# Patient Record
Sex: MALE | Race: WHITE | NOT HISPANIC OR LATINO | URBAN - METROPOLITAN AREA
[De-identification: names, ages, dates, MRNs, and addresses within clinical notes are randomized per-mention and may not be internally consistent; named-entity substitution may affect disease eponyms.]

---

## 2023-10-05 ENCOUNTER — NURSING HOME VISIT (OUTPATIENT)
Dept: POST ACUTE CARE | Facility: EXTERNAL LOCATION | Age: 78
End: 2023-10-05
Payer: MEDICARE

## 2023-10-05 VITALS
SYSTOLIC BLOOD PRESSURE: 136 MMHG | OXYGEN SATURATION: 99 % | TEMPERATURE: 98.1 F | HEART RATE: 73 BPM | WEIGHT: 195.6 LBS | DIASTOLIC BLOOD PRESSURE: 87 MMHG | RESPIRATION RATE: 16 BRPM | BODY MASS INDEX: 31.57 KG/M2

## 2023-10-05 DIAGNOSIS — Z86.718 H/O DEEP VENOUS THROMBOSIS: ICD-10-CM

## 2023-10-05 DIAGNOSIS — I71.9 AORTIC ANEURYSM WITHOUT RUPTURE, UNSPECIFIED PORTION OF AORTA (CMS-HCC): ICD-10-CM

## 2023-10-05 DIAGNOSIS — I63.40 CEREBROVASCULAR ACCIDENT (CVA) DUE TO EMBOLISM OF CEREBRAL ARTERY (MULTI): ICD-10-CM

## 2023-10-05 DIAGNOSIS — I10 HTN (HYPERTENSION), BENIGN: Primary | ICD-10-CM

## 2023-10-05 DIAGNOSIS — R60.0 LOWER EXTREMITY EDEMA: ICD-10-CM

## 2023-10-05 DIAGNOSIS — F01.B0 MODERATE VASCULAR DEMENTIA WITHOUT BEHAVIORAL DISTURBANCE, PSYCHOTIC DISTURBANCE, MOOD DISTURBANCE, OR ANXIETY (MULTI): ICD-10-CM

## 2023-10-05 DIAGNOSIS — N18.30 STAGE 3 CHRONIC KIDNEY DISEASE, UNSPECIFIED WHETHER STAGE 3A OR 3B CKD (MULTI): ICD-10-CM

## 2023-10-05 DIAGNOSIS — J43.9 PULMONARY EMPHYSEMA, UNSPECIFIED EMPHYSEMA TYPE (MULTI): ICD-10-CM

## 2023-10-05 PROBLEM — J44.9 COPD (CHRONIC OBSTRUCTIVE PULMONARY DISEASE) (MULTI): Status: ACTIVE | Noted: 2023-10-05

## 2023-10-05 PROBLEM — F03.90 DEMENTIA WITHOUT BEHAVIORAL DISTURBANCE, PSYCHOTIC DISTURBANCE, MOOD DISTURBANCE, OR ANXIETY (MULTI): Status: ACTIVE | Noted: 2023-10-05

## 2023-10-05 PROCEDURE — 99308 SBSQ NF CARE LOW MDM 20: CPT | Performed by: PHYSICIAN ASSISTANT

## 2023-10-05 ASSESSMENT — ENCOUNTER SYMPTOMS
VOMITING: 0
SHORTNESS OF BREATH: 0
HEMATURIA: 0
CHILLS: 0
DYSURIA: 0
NERVOUS/ANXIOUS: 0
COUGH: 0
WHEEZING: 0
FREQUENCY: 0
DIZZINESS: 0
CONFUSION: 1
CONSTIPATION: 0
DIARRHEA: 0
WEAKNESS: 0
ABDOMINAL PAIN: 0
HEADACHES: 0
NAUSEA: 0
FEVER: 0
TREMORS: 0
APPETITE CHANGE: 0

## 2023-10-05 NOTE — ASSESSMENT & PLAN NOTE
Weight gain of 12#.   Pt has been off diuretics due to recent illness.   Will restart torsemide 20mg daily with potassium chloride 20MEQ daily.   Weekly bmp x 2 and monitor weekly weights.

## 2023-10-05 NOTE — ASSESSMENT & PLAN NOTE
Monitor labs.   Recent KENTRELL on CKD due to illness and diuretics were stopped.  Now with significant weight gain.  Restarting low dose diuretics.  Monitor weekly bmp x 2 weeks.

## 2023-10-05 NOTE — PROGRESS NOTES
No chief complaint on file.      Subjective   [id number] : Syed Meyers is a 77 y.o. male LTC resident at [facility].   HPI  Pt recently treated for KENTRELL and acute UTI.   He has completed course of antibiotics.  KENTRELL was treated with IVF and his torsemide was stopped.   His labs have improved.   He is noted to have a 12# weight gain since stopping the diuretics.   He is noted to have bilateral lower extremity edema.  NO respiratory symptoms.  NO shortness of breath or cough reported by nursing.  Nursing does report that pt does eat a large amount of food with his meals and snacks throughout the day.     Review of Systems   Constitutional:  Negative for appetite change, chills and fever.   Respiratory:  Negative for cough, shortness of breath and wheezing.    Cardiovascular:  Negative for chest pain and leg swelling.   Gastrointestinal:  Negative for abdominal pain, constipation, diarrhea, nausea and vomiting.   Genitourinary:  Negative for dysuria, frequency and hematuria.   Neurological:  Negative for dizziness, tremors, weakness and headaches.   Psychiatric/Behavioral:  Positive for confusion (due to dementia). The patient is not nervous/anxious.    All other systems reviewed and are negative.      Objective   /87   Pulse 73   Temp 36.7 °C (98.1 °F)   Resp 16   Wt 88.7 kg (195 lb 9.6 oz)   SpO2 99%   BMI 31.57 kg/m²    Physical Exam  Constitutional:       General: He is not in acute distress.  Eyes:      Conjunctiva/sclera: Conjunctivae normal.      Pupils: Pupils are equal, round, and reactive to light.   Cardiovascular:      Rate and Rhythm: Normal rate and regular rhythm.      Heart sounds: No murmur heard.  Pulmonary:      Effort: Pulmonary effort is normal.      Breath sounds: No wheezing, rhonchi or rales.   Abdominal:      General: Abdomen is flat. Bowel sounds are normal. There is no distension.      Palpations: Abdomen is soft. There is no mass.      Tenderness: There is no abdominal  "tenderness.   Musculoskeletal:         General: Swelling (2+ pitting edema in the ankles and feet bilaterally.) present. Normal range of motion.   Skin:     General: Skin is warm and dry.      Findings: No erythema or rash.   Neurological:      General: No focal deficit present.      Mental Status: He is alert. Mental status is at baseline.      Comments: A&O x 1-2       No lab exists for component: \"CBC BMP\"  Assessment/Plan   Problem List Items Addressed This Visit             ICD-10-CM    Cerebrovascular accident (CVA) due to embolism of cerebral artery (CMS/[facility]) I63.40     With residual cognitive impairment.   Aspirin stopped due to GIB.          CKD (chronic kidney disease) stage 3, GFR 30-59 ml/min (CMS/[facility]) N18.30      Monitor labs.   Recent KENTRELL on CKD due to illness and diuretics were stopped.  Now with significant weight gain.  Restarting low dose diuretics.  Monitor weekly bmp x 2 weeks.           Dementia without behavioral disturbance, psychotic disturbance, mood disturbance, or anxiety (CMS/[facility]) F03.90     Continue Donepezil.         H/O deep venous thrombosis Z86.718     IVC filter has been removed.         HTN (hypertension), benign - Primary I10     Continue losartan.  Monitor blood pressures and adjust meds as needed.         Aortic aneurysm (CMS/[facility]) I71.9     Follows with Vascular         Lower extremity edema R60.0     Weight gain of 12#.   Pt has been off diuretics due to recent illness.   Will restart torsemide 20mg daily with potassium chloride 20MEQ daily.   Weekly bmp x 2 and monitor weekly weights.          COPD (chronic obstructive pulmonary disease) (CMS/[facility]) J44.9     Continue advair and albuterol prn.              "

## 2023-10-05 NOTE — LETTER
Patient: Syed Meyers  : 1945    Encounter Date: 10/05/2023    No chief complaint on file.      Subjective  [id number] : Syed Meyers is a 77 y.o. male LTC resident at [facility].   HPI  Pt recently treated for KENTRELL and acute UTI.   He has completed course of antibiotics.  KENTRELL was treated with IVF and his torsemide was stopped.   His labs have improved.   He is noted to have a 12# weight gain since stopping the diuretics.   He is noted to have bilateral lower extremity edema.  NO respiratory symptoms.  NO shortness of breath or cough reported by nursing.  Nursing does report that pt does eat a large amount of food with his meals and snacks throughout the day.     Review of Systems   Constitutional:  Negative for appetite change, chills and fever.   Respiratory:  Negative for cough, shortness of breath and wheezing.    Cardiovascular:  Negative for chest pain and leg swelling.   Gastrointestinal:  Negative for abdominal pain, constipation, diarrhea, nausea and vomiting.   Genitourinary:  Negative for dysuria, frequency and hematuria.   Neurological:  Negative for dizziness, tremors, weakness and headaches.   Psychiatric/Behavioral:  Positive for confusion (due to dementia). The patient is not nervous/anxious.    All other systems reviewed and are negative.      Objective  /87   Pulse 73   Temp 36.7 °C (98.1 °F)   Resp 16   Wt 88.7 kg (195 lb 9.6 oz)   SpO2 99%   BMI 31.57 kg/m²    Physical Exam  Constitutional:       General: He is not in acute distress.  Eyes:      Conjunctiva/sclera: Conjunctivae normal.      Pupils: Pupils are equal, round, and reactive to light.   Cardiovascular:      Rate and Rhythm: Normal rate and regular rhythm.      Heart sounds: No murmur heard.  Pulmonary:      Effort: Pulmonary effort is normal.      Breath sounds: No wheezing, rhonchi or rales.   Abdominal:      General: Abdomen is flat. Bowel sounds are normal. There is no distension.      Palpations: Abdomen  "is soft. There is no mass.      Tenderness: There is no abdominal tenderness.   Musculoskeletal:         General: Swelling (2+ pitting edema in the ankles and feet bilaterally.) present. Normal range of motion.   Skin:     General: Skin is warm and dry.      Findings: No erythema or rash.   Neurological:      General: No focal deficit present.      Mental Status: He is alert. Mental status is at baseline.      Comments: A&O x 1-2       No lab exists for component: \"CBC BMP\"  Assessment/Plan  Problem List Items Addressed This Visit             ICD-10-CM    Cerebrovascular accident (CVA) due to embolism of cerebral artery (CMS/[facility]) I63.40     With residual cognitive impairment.   Aspirin stopped due to GIB.          CKD (chronic kidney disease) stage 3, GFR 30-59 ml/min (CMS/[facility]) N18.30      Monitor labs.   Recent KENTRELL on CKD due to illness and diuretics were stopped.  Now with significant weight gain.  Restarting low dose diuretics.  Monitor weekly bmp x 2 weeks.           Dementia without behavioral disturbance, psychotic disturbance, mood disturbance, or anxiety (CMS/[facility]) F03.90     Continue Donepezil.         H/O deep venous thrombosis Z86.718     IVC filter has been removed.         HTN (hypertension), benign - Primary I10     Continue losartan.  Monitor blood pressures and adjust meds as needed.         Aortic aneurysm (CMS/[facility]) I71.9     Follows with Vascular         Lower extremity edema R60.0     Weight gain of 12#.   Pt has been off diuretics due to recent illness.   Will restart torsemide 20mg daily with potassium chloride 20MEQ daily.   Weekly bmp x 2 and monitor weekly weights.          COPD (chronic obstructive pulmonary disease) (CMS/[facility]) J44.9     Continue advair and albuterol prn.                Electronically Signed By: Karol Gant PA-C   10/5/23 12:57 PM  "

## 2023-10-09 ENCOUNTER — LAB REQUISITION (OUTPATIENT)
Dept: LAB | Facility: HOSPITAL | Age: 78
End: 2023-10-09
Payer: MEDICARE

## 2023-10-09 DIAGNOSIS — F03.90 UNSPECIFIED DEMENTIA, UNSPECIFIED SEVERITY, WITHOUT BEHAVIORAL DISTURBANCE, PSYCHOTIC DISTURBANCE, MOOD DISTURBANCE, AND ANXIETY (MULTI): ICD-10-CM

## 2023-10-09 DIAGNOSIS — N18.30 CHRONIC KIDNEY DISEASE, STAGE 3 UNSPECIFIED (MULTI): ICD-10-CM

## 2023-10-09 LAB
ANION GAP SERPL CALC-SCNC: 8 MMOL/L
BUN SERPL-MCNC: 23 MG/DL (ref 8–25)
CALCIUM SERPL-MCNC: 8.3 MG/DL (ref 8.5–10.4)
CHLORIDE SERPL-SCNC: 108 MMOL/L (ref 97–107)
CO2 SERPL-SCNC: 26 MMOL/L (ref 24–31)
CREAT SERPL-MCNC: 1.8 MG/DL (ref 0.4–1.6)
GFR SERPL CREATININE-BSD FRML MDRD: 38 ML/MIN/1.73M*2
GLUCOSE SERPL-MCNC: 91 MG/DL (ref 65–99)
POTASSIUM SERPL-SCNC: 4.4 MMOL/L (ref 3.4–5.1)
SODIUM SERPL-SCNC: 142 MMOL/L (ref 133–145)

## 2023-10-09 PROCEDURE — 80048 BASIC METABOLIC PNL TOTAL CA: CPT | Mod: OUT | Performed by: INTERNAL MEDICINE

## 2023-10-16 ENCOUNTER — LAB REQUISITION (OUTPATIENT)
Dept: LAB | Facility: HOSPITAL | Age: 78
End: 2023-10-16
Payer: MEDICARE

## 2023-10-16 DIAGNOSIS — N18.30 CHRONIC KIDNEY DISEASE, STAGE 3 UNSPECIFIED (MULTI): ICD-10-CM

## 2023-10-16 DIAGNOSIS — F03.90 UNSPECIFIED DEMENTIA, UNSPECIFIED SEVERITY, WITHOUT BEHAVIORAL DISTURBANCE, PSYCHOTIC DISTURBANCE, MOOD DISTURBANCE, AND ANXIETY (MULTI): ICD-10-CM

## 2023-10-16 LAB
ANION GAP SERPL CALC-SCNC: 10 MMOL/L
BUN SERPL-MCNC: 30 MG/DL (ref 8–25)
CALCIUM SERPL-MCNC: 8.7 MG/DL (ref 8.5–10.4)
CHLORIDE SERPL-SCNC: 108 MMOL/L (ref 97–107)
CO2 SERPL-SCNC: 26 MMOL/L (ref 24–31)
CREAT SERPL-MCNC: 1.7 MG/DL (ref 0.4–1.6)
GFR SERPL CREATININE-BSD FRML MDRD: 41 ML/MIN/1.73M*2
GLUCOSE SERPL-MCNC: 86 MG/DL (ref 65–99)
POTASSIUM SERPL-SCNC: 3.9 MMOL/L (ref 3.4–5.1)
SODIUM SERPL-SCNC: 144 MMOL/L (ref 133–145)

## 2023-10-16 PROCEDURE — 80048 BASIC METABOLIC PNL TOTAL CA: CPT | Mod: OUT | Performed by: INTERNAL MEDICINE

## 2023-10-20 ENCOUNTER — LAB REQUISITION (OUTPATIENT)
Dept: LAB | Facility: HOSPITAL | Age: 78
End: 2023-10-20
Payer: MEDICARE

## 2023-10-20 ENCOUNTER — NURSING HOME VISIT (OUTPATIENT)
Dept: POST ACUTE CARE | Facility: EXTERNAL LOCATION | Age: 78
End: 2023-10-20
Payer: MEDICARE

## 2023-10-20 VITALS
DIASTOLIC BLOOD PRESSURE: 60 MMHG | SYSTOLIC BLOOD PRESSURE: 112 MMHG | WEIGHT: 189.6 LBS | HEART RATE: 60 BPM | TEMPERATURE: 97.8 F | BODY MASS INDEX: 30.6 KG/M2

## 2023-10-20 DIAGNOSIS — N18.30 CHRONIC KIDNEY DISEASE, STAGE 3 UNSPECIFIED (MULTI): ICD-10-CM

## 2023-10-20 DIAGNOSIS — F03.90 UNSPECIFIED DEMENTIA, UNSPECIFIED SEVERITY, WITHOUT BEHAVIORAL DISTURBANCE, PSYCHOTIC DISTURBANCE, MOOD DISTURBANCE, AND ANXIETY (MULTI): ICD-10-CM

## 2023-10-20 DIAGNOSIS — J44.1 COPD EXACERBATION (MULTI): Primary | ICD-10-CM

## 2023-10-20 LAB
ANION GAP SERPL CALC-SCNC: 9 MMOL/L
BUN SERPL-MCNC: 32 MG/DL (ref 8–25)
CALCIUM SERPL-MCNC: 8.5 MG/DL (ref 8.5–10.4)
CHLORIDE SERPL-SCNC: 108 MMOL/L (ref 97–107)
CO2 SERPL-SCNC: 25 MMOL/L (ref 24–31)
CREAT SERPL-MCNC: 1.7 MG/DL (ref 0.4–1.6)
ERYTHROCYTE [DISTWIDTH] IN BLOOD BY AUTOMATED COUNT: 14.8 % (ref 11.5–14.5)
GFR SERPL CREATININE-BSD FRML MDRD: 41 ML/MIN/1.73M*2
GLUCOSE SERPL-MCNC: 92 MG/DL (ref 65–99)
HCT VFR BLD AUTO: 34.6 % (ref 41–52)
HGB BLD-MCNC: 11 G/DL (ref 13.5–17.5)
MCH RBC QN AUTO: 27.5 PG (ref 26–34)
MCHC RBC AUTO-ENTMCNC: 31.8 G/DL (ref 32–36)
MCV RBC AUTO: 87 FL (ref 80–100)
NRBC BLD-RTO: 0 /100 WBCS (ref 0–0)
PLATELET # BLD AUTO: 129 X10*3/UL (ref 150–450)
PMV BLD AUTO: 10.4 FL (ref 7.5–11.5)
POTASSIUM SERPL-SCNC: 4.2 MMOL/L (ref 3.4–5.1)
RBC # BLD AUTO: 4 X10*6/UL (ref 4.5–5.9)
SODIUM SERPL-SCNC: 142 MMOL/L (ref 133–145)
WBC # BLD AUTO: 3.5 X10*3/UL (ref 4.4–11.3)

## 2023-10-20 PROCEDURE — 85027 COMPLETE CBC AUTOMATED: CPT | Mod: OUT | Performed by: INTERNAL MEDICINE

## 2023-10-20 PROCEDURE — 80048 BASIC METABOLIC PNL TOTAL CA: CPT | Mod: OUT | Performed by: INTERNAL MEDICINE

## 2023-10-20 PROCEDURE — 99309 SBSQ NF CARE MODERATE MDM 30: CPT | Performed by: PHYSICIAN ASSISTANT

## 2023-10-20 ASSESSMENT — ENCOUNTER SYMPTOMS
DYSURIA: 0
CONFUSION: 1
CHILLS: 0
FEVER: 0
HEMATURIA: 0
WHEEZING: 0
ABDOMINAL PAIN: 0
FREQUENCY: 0
FATIGUE: 1
VOMITING: 0
DIARRHEA: 0
TREMORS: 0
APPETITE CHANGE: 0
NERVOUS/ANXIOUS: 0
HEADACHES: 0
NAUSEA: 0
DIZZINESS: 0
COUGH: 1
SHORTNESS OF BREATH: 0
CONSTIPATION: 0
WEAKNESS: 0

## 2023-10-20 NOTE — LETTER
Patient: Syed Meyers  : 1945    Encounter Date: 10/20/2023    No chief complaint on file.      Subjective  [id number] : Syed Meyers is a 77 y.o. male LTC resident at [facility].   \[facility] [place] [facility]\""  Nursing reported that pt was noted to be coughing at lunch yesterday.  No report of any choking episode.   Pt seen while resting in bed this morning.   He appears to be more fatigued than usual.   He does not appear to be feeling well.   No fevers or chills reported.  He is a poor historian due to dementia.  He is noted to have a cough and some rhonchi on exam.   Chest x-ray was done and is negative.  He also had labs today which were reviewed and are stable.  He does have some lower leg edema 1+ pitting.  No shortness of breath or hypoxia.  Code status is [facility]  Review of Systems   Constitutional:  Positive for fatigue. Negative for appetite change, chills and fever.   Respiratory:  Positive for cough. Negative for shortness of breath and wheezing.    Cardiovascular:  Negative for chest pain and leg swelling.   Gastrointestinal:  Negative for abdominal pain, constipation, diarrhea, nausea and vomiting.   Genitourinary:  Negative for dysuria, frequency and hematuria.   Neurological:  Negative for dizziness, tremors, weakness and headaches.   Psychiatric/Behavioral:  Positive for confusion (due to dementia). The patient is not nervous/anxious.    All other systems reviewed and are negative.      Objective  /60   Pulse 60   Temp 36.6 °C (97.8 °F)   Wt 86 kg (189 lb 9.6 oz)   BMI 30.60 kg/m²    Physical Exam  Constitutional:       General: He is not in acute distress.  Eyes:      Conjunctiva/sclera: Conjunctivae normal.      Pupils: Pupils are equal, round, and reactive to light.   Cardiovascular:      Rate and Rhythm: Normal rate and regular rhythm.      Heart sounds: No murmur heard.  Pulmonary:      Effort: Pulmonary effort is normal. No respiratory distress.      Breath sounds: Rhonchi present. No wheezing  or rales.      Comments: Moist cough noted.   Abdominal:      General: Abdomen is flat. Bowel sounds are normal. There is no distension.      Palpations: Abdomen is soft. There is no mass.      Tenderness: There is no abdominal tenderness.   Musculoskeletal:         General: Swelling (1+ pitting edema in the ankles and feet bilaterally.) present. Normal range of motion.   Skin:     General: Skin is warm and dry.      Findings: No erythema or rash.   Neurological:      General: No focal deficit present.      Mental Status: He is alert. Mental status is at baseline.      Comments: A&O x 1-2       Cerebrovascular accident (CVA) due to embolism of cerebral artery (CMS/[facility]) I63.40        With residual cognitive impairment.   Aspirin stopped due to GIB.            CKD (chronic kidney disease) stage 3, GFR 30-59 ml/min (CMS/[facility]) N18.30       Labs are stable.            Dementia without behavioral disturbance, psychotic disturbance, mood disturbance, or anxiety (CMS/[facility]) F03.90       Continue Donepezil.           H/O deep venous thrombosis Z86.718       IVC filter has been removed.           HTN (hypertension), benign - Primary I10       Continue losartan.  Monitor blood pressures and adjust meds as needed.           Aortic aneurysm (CMS/[facility]) I71.9       Follows with Vascular           Lower extremity edema R60.0       Continue torsemide 20mg daily with potassium chloride 20MEQ daily.   weekly labs are stable.            COPD (chronic obstructive pulmonary disease) (CMS/[facility]) J44.9       Continue advair and albuterol prn.      COPD exacerbation  Chest x-ray done and reviewed.  Negative for pneumonia or chf.  Continue advair and albuterol TID routine.   Add augmentin 500/125 q 8 hours x 5 days. And mucinex 600mg BID routine x 5 days.            Time spent: 30 min in review of chart, labs and orders, consultation with pt and documentation.       Electronically Signed By: aKrol Gant PA-C   10/20/23  1:54 PM

## 2023-10-20 NOTE — PROGRESS NOTES
No chief complaint on file.      Subjective   [id number] : Syed Meyers is a 77 y.o. male LTC resident at [facility].   HPI  Nursing reported that pt was noted to be coughing at lunch yesterday.  No report of any choking episode.   Pt seen while resting in bed this morning.   He appears to be more fatigued than usual.   He does not appear to be feeling well.   No fevers or chills reported.  He is a poor historian due to dementia.  He is noted to have a cough and some rhonchi on exam.   Chest x-ray was done and is negative.  He also had labs today which were reviewed and are stable.  He does have some lower leg edema 1+ pitting.  No shortness of breath or hypoxia.  Code status is [facility]  Review of Systems   Constitutional:  Positive for fatigue. Negative for appetite change, chills and fever.   Respiratory:  Positive for cough. Negative for shortness of breath and wheezing.    Cardiovascular:  Negative for chest pain and leg swelling.   Gastrointestinal:  Negative for abdominal pain, constipation, diarrhea, nausea and vomiting.   Genitourinary:  Negative for dysuria, frequency and hematuria.   Neurological:  Negative for dizziness, tremors, weakness and headaches.   Psychiatric/Behavioral:  Positive for confusion (due to dementia). The patient is not nervous/anxious.    All other systems reviewed and are negative.      Objective   /60   Pulse 60   Temp 36.6 °C (97.8 °F)   Wt 86 kg (189 lb 9.6 oz)   BMI 30.60 kg/m²    Physical Exam  Constitutional:       General: He is not in acute distress.  Eyes:      Conjunctiva/sclera: Conjunctivae normal.      Pupils: Pupils are equal, round, and reactive to light.   Cardiovascular:      Rate and Rhythm: Normal rate and regular rhythm.      Heart sounds: No murmur heard.  Pulmonary:      Effort: Pulmonary effort is normal. No respiratory distress.      Breath sounds: Rhonchi present. No wheezing or rales.      Comments: Moist cough noted.   Abdominal:      General:  Abdomen is flat. Bowel sounds are normal. There is no distension.      Palpations: Abdomen is soft. There is no mass.      Tenderness: There is no abdominal tenderness.   Musculoskeletal:         General: Swelling (1+ pitting edema in the ankles and feet bilaterally.) present. Normal range of motion.   Skin:     General: Skin is warm and dry.      Findings: No erythema or rash.   Neurological:      General: No focal deficit present.      Mental Status: He is alert. Mental status is at baseline.      Comments: A&O x 1-2       Cerebrovascular accident (CVA) due to embolism of cerebral artery (CMS/[facility]) I63.40        With residual cognitive impairment.   Aspirin stopped due to GIB.            CKD (chronic kidney disease) stage 3, GFR 30-59 ml/min (CMS/[facility]) N18.30       Labs are stable.            Dementia without behavioral disturbance, psychotic disturbance, mood disturbance, or anxiety (CMS/[facility]) F03.90       Continue Donepezil.           H/O deep venous thrombosis Z86.718       IVC filter has been removed.           HTN (hypertension), benign - Primary I10       Continue losartan.  Monitor blood pressures and adjust meds as needed.           Aortic aneurysm (CMS/[facility]) I71.9       Follows with Vascular           Lower extremity edema R60.0       Continue torsemide 20mg daily with potassium chloride 20MEQ daily.   weekly labs are stable.            COPD (chronic obstructive pulmonary disease) (CMS/[facility]) J44.9       Continue advair and albuterol prn.      COPD exacerbation  Chest x-ray done and reviewed.  Negative for pneumonia or chf.  Continue advair and albuterol TID routine.   Add augmentin 500/125 q 8 hours x 5 days. And mucinex 600mg BID routine x 5 days.            Time spent: 30 min in review of chart, labs and orders, consultation with pt and documentation.

## 2023-10-20 NOTE — ASSESSMENT & PLAN NOTE
Chest x-ray done and reviewed.  Negative for pneumonia or chf.  Continue advair and albuterol TID routine.   Add augmentin 500/125 q 8 hours x 5 days. And mucinex 600mg BID routine x 5 days.

## 2023-10-30 PROCEDURE — 87086 URINE CULTURE/COLONY COUNT: CPT | Mod: OUT,TRILAB | Performed by: INTERNAL MEDICINE

## 2023-10-30 PROCEDURE — 81003 URINALYSIS AUTO W/O SCOPE: CPT | Mod: OUT | Performed by: INTERNAL MEDICINE

## 2023-10-31 ENCOUNTER — LAB REQUISITION (OUTPATIENT)
Dept: LAB | Facility: HOSPITAL | Age: 78
End: 2023-10-31
Payer: MEDICARE

## 2023-10-31 DIAGNOSIS — N18.30 CHRONIC KIDNEY DISEASE, STAGE 3 UNSPECIFIED (MULTI): ICD-10-CM

## 2023-10-31 LAB
APPEARANCE UR: CLEAR
BILIRUB UR STRIP.AUTO-MCNC: NEGATIVE MG/DL
COLOR UR: NORMAL
GLUCOSE UR STRIP.AUTO-MCNC: NORMAL MG/DL
KETONES UR STRIP.AUTO-MCNC: NEGATIVE MG/DL
LEUKOCYTE ESTERASE UR QL STRIP.AUTO: NEGATIVE
NITRITE UR QL STRIP.AUTO: NEGATIVE
PH UR STRIP.AUTO: 5 [PH]
PROT UR STRIP.AUTO-MCNC: NEGATIVE MG/DL
RBC # UR STRIP.AUTO: NEGATIVE /UL
SP GR UR STRIP.AUTO: 1.01
UROBILINOGEN UR STRIP.AUTO-MCNC: NORMAL MG/DL

## 2023-11-01 LAB — BACTERIA UR CULT: NORMAL

## 2023-11-16 ENCOUNTER — NURSING HOME VISIT (OUTPATIENT)
Dept: POST ACUTE CARE | Facility: EXTERNAL LOCATION | Age: 78
End: 2023-11-16
Payer: MEDICARE

## 2023-11-16 VITALS
OXYGEN SATURATION: 95 % | HEART RATE: 84 BPM | TEMPERATURE: 98.8 F | WEIGHT: 186.6 LBS | SYSTOLIC BLOOD PRESSURE: 121 MMHG | DIASTOLIC BLOOD PRESSURE: 82 MMHG | RESPIRATION RATE: 18 BRPM | BODY MASS INDEX: 30.12 KG/M2

## 2023-11-16 DIAGNOSIS — F01.B0 MODERATE VASCULAR DEMENTIA WITHOUT BEHAVIORAL DISTURBANCE, PSYCHOTIC DISTURBANCE, MOOD DISTURBANCE, OR ANXIETY (MULTI): ICD-10-CM

## 2023-11-16 DIAGNOSIS — I10 HTN (HYPERTENSION), BENIGN: ICD-10-CM

## 2023-11-16 DIAGNOSIS — U07.1 COVID: Primary | ICD-10-CM

## 2023-11-16 DIAGNOSIS — I63.40 CEREBROVASCULAR ACCIDENT (CVA) DUE TO EMBOLISM OF CEREBRAL ARTERY (MULTI): ICD-10-CM

## 2023-11-16 DIAGNOSIS — J43.9 PULMONARY EMPHYSEMA, UNSPECIFIED EMPHYSEMA TYPE (MULTI): ICD-10-CM

## 2023-11-16 PROCEDURE — 99309 SBSQ NF CARE MODERATE MDM 30: CPT | Performed by: PHYSICIAN ASSISTANT

## 2023-11-16 ASSESSMENT — ENCOUNTER SYMPTOMS
FATIGUE: 1
DYSURIA: 0
VOMITING: 0
WHEEZING: 0
HEMATURIA: 0
SHORTNESS OF BREATH: 0
FEVER: 0
HEADACHES: 0
ABDOMINAL PAIN: 0
CHILLS: 0
NERVOUS/ANXIOUS: 0
DIARRHEA: 0
TREMORS: 0
COUGH: 1
CONSTIPATION: 0
APPETITE CHANGE: 1
FREQUENCY: 0
CONFUSION: 1
DIZZINESS: 0
WEAKNESS: 0
NAUSEA: 0

## 2023-11-16 NOTE — LETTER
"Patient: Syed Meyers  : 1945    Encounter Date: 2023    No chief complaint on file.      Subjective  [id number] : Syed Meyers is a 77 y.o. male LTC resident at [facility].   HPI  Asked to see pt this morning by nursing.   Pt noted to be congested and with a productive cough.   Pt not himself this morning.  He is weak and having difficulty sitting up in wheelchair per nursing.  Pt also did not eat any breakfast.   No fever or chills reported.  Pt seen while lying in bed.   He appears to be a little confused.  He says \" I feel fine\".   He does appear to be ill.  He had nasal swab which is positive for Covid.   He is not in any respiratory distress at this time and is stable on room air.   He is a poor historian due to dementia.  Code status is [facility].  Review of Systems   Constitutional:  Positive for appetite change and fatigue. Negative for chills and fever.   Respiratory:  Positive for cough. Negative for shortness of breath and wheezing.    Cardiovascular:  Negative for chest pain and leg swelling.   Gastrointestinal:  Negative for abdominal pain, constipation, diarrhea, nausea and vomiting.   Genitourinary:  Negative for dysuria, frequency and hematuria.   Neurological:  Negative for dizziness, tremors, weakness and headaches.   Psychiatric/Behavioral:  Positive for confusion (due to dementia). The patient is not nervous/anxious.    All other systems reviewed and are negative.      Objective  /82   Pulse 84   Temp 37.1 °C (98.8 °F)   Resp 18   Wt 84.6 kg (186 lb 9.6 oz)   SpO2 95%   BMI 30.12 kg/m²    Physical Exam  Constitutional:       General: He is not in acute distress.  Eyes:      Conjunctiva/sclera: Conjunctivae normal.      Pupils: Pupils are equal, round, and reactive to light.   Cardiovascular:      Rate and Rhythm: Normal rate and regular rhythm.      Heart sounds: No murmur heard.  Pulmonary:      Effort: Pulmonary effort is normal. No respiratory distress.      " Breath sounds: Rhonchi present. No wheezing or rales.      Comments: Moist cough noted.   Abdominal:      General: Abdomen is flat. Bowel sounds are normal. There is no distension.      Palpations: Abdomen is soft. There is no mass.      Tenderness: There is no abdominal tenderness.   Musculoskeletal:         General: Swelling (1+ pitting edema in the ankles and feet bilaterally.) present. Normal range of motion.   Skin:     General: Skin is warm and dry.      Findings: No erythema or rash.   Neurological:      General: No focal deficit present.      Mental Status: He is alert. Mental status is at baseline.      Comments: A&O x 1-2       Cerebrovascular accident (CVA) due to embolism of cerebral artery (CMS/[facility]) I63.40        With residual cognitive impairment.   Aspirin stopped due to GIB.            CKD (chronic kidney disease) stage 3, GFR 30-59 ml/min (CMS/[facility]) N18.30       Labs are stable.            Dementia without behavioral disturbance, psychotic disturbance, mood disturbance, or anxiety (CMS/[facility]) F03.90       Continue Donepezil.           H/O deep venous thrombosis Z86.718       IVC filter has been removed.           HTN (hypertension), benign - Primary I10       Continue losartan.  Monitor blood pressures and adjust meds as needed.           Aortic aneurysm (CMS/[facility]) I71.9       Follows with Vascular           Lower extremity edema R60.0       Continue torsemide 20mg daily with potassium chloride 20MEQ daily.   weekly labs are stable.            COPD (chronic obstructive pulmonary disease) (CMS/[facility]) J44.9       Continue advair and albuterol prn.                Time spent: 30 min in review of chart, labs and orders, consultation with pt and documentation.       Electronically Signed By: Karol Gant PA-C   11/16/23  6:55 PM

## 2023-11-16 NOTE — PROGRESS NOTES
"No chief complaint on file.      Subjective   [id number] : Syed Meyers is a 77 y.o. male LTC resident at [facility].   HPI  Asked to see pt this morning by nursing.   Pt noted to be congested and with a productive cough.   Pt not himself this morning.  He is weak and having difficulty sitting up in wheelchair per nursing.  Pt also did not eat any breakfast.   No fever or chills reported.  Pt seen while lying in bed.   He appears to be a little confused.  He says \" I feel fine\".   He does appear to be ill.  He had nasal swab which is positive for Covid.   He is not in any respiratory distress at this time and is stable on room air.   He is a poor historian due to dementia.  Code status is DNC.  Review of Systems   Constitutional:  Positive for appetite change and fatigue. Negative for chills and fever.   Respiratory:  Positive for cough. Negative for shortness of breath and wheezing.    Cardiovascular:  Negative for chest pain and leg swelling.   Gastrointestinal:  Negative for abdominal pain, constipation, diarrhea, nausea and vomiting.   Genitourinary:  Negative for dysuria, frequency and hematuria.   Neurological:  Negative for dizziness, tremors, weakness and headaches.   Psychiatric/Behavioral:  Positive for confusion (due to dementia). The patient is not nervous/anxious.    All other systems reviewed and are negative.      Objective   /82   Pulse 84   Temp 37.1 °C (98.8 °F)   Resp 18   Wt 84.6 kg (186 lb 9.6 oz)   SpO2 95%   BMI 30.12 kg/m²    Physical Exam  Constitutional:       General: He is not in acute distress.  Eyes:      Conjunctiva/sclera: Conjunctivae normal.      Pupils: Pupils are equal, round, and reactive to light.   Cardiovascular:      Rate and Rhythm: Normal rate and regular rhythm.      Heart sounds: No murmur heard.  Pulmonary:      Effort: Pulmonary effort is normal. No respiratory distress.      Breath sounds: Rhonchi present. No wheezing or rales.      Comments: Moist " cough noted.   Abdominal:      General: Abdomen is flat. Bowel sounds are normal. There is no distension.      Palpations: Abdomen is soft. There is no mass.      Tenderness: There is no abdominal tenderness.   Musculoskeletal:         General: Swelling (1+ pitting edema in the ankles and feet bilaterally.) present. Normal range of motion.   Skin:     General: Skin is warm and dry.      Findings: No erythema or rash.   Neurological:      General: No focal deficit present.      Mental Status: He is alert. Mental status is at baseline.      Comments: A&O x 1-2       Cerebrovascular accident (CVA) due to embolism of cerebral artery (CMS/[facility]) I63.40        With residual cognitive impairment.   Aspirin stopped due to GIB.            CKD (chronic kidney disease) stage 3, GFR 30-59 ml/min (CMS/[facility]) N18.30       Labs are stable.            Dementia without behavioral disturbance, psychotic disturbance, mood disturbance, or anxiety (CMS/[facility]) F03.90       Continue Donepezil.           H/O deep venous thrombosis Z86.718       IVC filter has been removed.           HTN (hypertension), benign - Primary I10       Continue losartan.  Monitor blood pressures and adjust meds as needed.           Aortic aneurysm (CMS/[facility]) I71.9       Follows with Vascular           Lower extremity edema R60.0       Continue torsemide 20mg daily with potassium chloride 20MEQ daily.   weekly labs are stable.            COPD (chronic obstructive pulmonary disease) (CMS/[facility]) J44.9       Continue advair and albuterol prn.       COVID:  Pt tested positive for covid today.   He has cold symptoms with nasal congestion and some cough.  Called pt's POA his brother and updated on pt condition and Covid positive status. He gave consent for use of paxlovid for treatment.  Paxlovid 150mg BID x 5 days ordered.  Monitor for symptoms and treat as necessary.          Time spent: 30 min in review of chart, labs and orders, consultation with pt and documentation.

## 2023-11-16 NOTE — ASSESSMENT & PLAN NOTE
Pt tested positive for covid today.   He has cold symptoms with nasal congestion and some cough.  Called pt's POA his brother and updated on pt condition and Covid positive status. He gave consent for use of paxlovid for treatment.  Paxlovid 150mg BID x 5 days ordered.  Monitor for symptoms and treat as necessary.   
negative...

## 2023-11-20 ENCOUNTER — LAB REQUISITION (OUTPATIENT)
Dept: LAB | Facility: HOSPITAL | Age: 78
End: 2023-11-20
Payer: MEDICARE

## 2023-11-20 ENCOUNTER — NURSING HOME VISIT (OUTPATIENT)
Dept: POST ACUTE CARE | Facility: EXTERNAL LOCATION | Age: 78
End: 2023-11-20
Payer: MEDICARE

## 2023-11-20 VITALS
WEIGHT: 186.6 LBS | HEART RATE: 55 BPM | SYSTOLIC BLOOD PRESSURE: 159 MMHG | OXYGEN SATURATION: 97 % | RESPIRATION RATE: 18 BRPM | TEMPERATURE: 97.9 F | BODY MASS INDEX: 30.12 KG/M2 | DIASTOLIC BLOOD PRESSURE: 92 MMHG

## 2023-11-20 DIAGNOSIS — N18.30 CHRONIC KIDNEY DISEASE, STAGE 3 UNSPECIFIED (MULTI): ICD-10-CM

## 2023-11-20 DIAGNOSIS — J43.9 PULMONARY EMPHYSEMA, UNSPECIFIED EMPHYSEMA TYPE (MULTI): ICD-10-CM

## 2023-11-20 DIAGNOSIS — U07.1 COVID: Primary | ICD-10-CM

## 2023-11-20 DIAGNOSIS — R60.0 LOWER EXTREMITY EDEMA: ICD-10-CM

## 2023-11-20 DIAGNOSIS — N17.9 AKI (ACUTE KIDNEY INJURY) (CMS-HCC): ICD-10-CM

## 2023-11-20 DIAGNOSIS — F03.90 UNSPECIFIED DEMENTIA, UNSPECIFIED SEVERITY, WITHOUT BEHAVIORAL DISTURBANCE, PSYCHOTIC DISTURBANCE, MOOD DISTURBANCE, AND ANXIETY (MULTI): ICD-10-CM

## 2023-11-20 LAB
ANION GAP SERPL CALC-SCNC: 9 MMOL/L
BUN SERPL-MCNC: 42 MG/DL (ref 8–25)
CALCIUM SERPL-MCNC: 8.4 MG/DL (ref 8.5–10.4)
CHLORIDE SERPL-SCNC: 107 MMOL/L (ref 97–107)
CO2 SERPL-SCNC: 29 MMOL/L (ref 24–31)
CREAT SERPL-MCNC: 2.3 MG/DL (ref 0.4–1.6)
ERYTHROCYTE [DISTWIDTH] IN BLOOD BY AUTOMATED COUNT: 14.5 % (ref 11.5–14.5)
GFR SERPL CREATININE-BSD FRML MDRD: 28 ML/MIN/1.73M*2
GLUCOSE SERPL-MCNC: 118 MG/DL (ref 65–99)
HCT VFR BLD AUTO: 38.6 % (ref 41–52)
HGB BLD-MCNC: 12.4 G/DL (ref 13.5–17.5)
MCH RBC QN AUTO: 27.4 PG (ref 26–34)
MCHC RBC AUTO-ENTMCNC: 32.1 G/DL (ref 32–36)
MCV RBC AUTO: 85 FL (ref 80–100)
NRBC BLD-RTO: 0 /100 WBCS (ref 0–0)
PLATELET # BLD AUTO: 163 X10*3/UL (ref 150–450)
POTASSIUM SERPL-SCNC: 4.2 MMOL/L (ref 3.4–5.1)
RBC # BLD AUTO: 4.52 X10*6/UL (ref 4.5–5.9)
SODIUM SERPL-SCNC: 145 MMOL/L (ref 133–145)
WBC # BLD AUTO: 3.5 X10*3/UL (ref 4.4–11.3)

## 2023-11-20 PROCEDURE — 99309 SBSQ NF CARE MODERATE MDM 30: CPT | Performed by: PHYSICIAN ASSISTANT

## 2023-11-20 PROCEDURE — 80048 BASIC METABOLIC PNL TOTAL CA: CPT | Mod: OUT | Performed by: INTERNAL MEDICINE

## 2023-11-20 PROCEDURE — 85027 COMPLETE CBC AUTOMATED: CPT | Mod: OUT | Performed by: INTERNAL MEDICINE

## 2023-11-20 ASSESSMENT — ENCOUNTER SYMPTOMS
HEADACHES: 0
WHEEZING: 0
FREQUENCY: 0
TREMORS: 0
APPETITE CHANGE: 1
HEMATURIA: 0
WEAKNESS: 0
CONSTIPATION: 0
NERVOUS/ANXIOUS: 0
NAUSEA: 0
DIARRHEA: 0
CHILLS: 0
DYSURIA: 0
ABDOMINAL PAIN: 0
FATIGUE: 1
SHORTNESS OF BREATH: 0
DIZZINESS: 0
VOMITING: 0
COUGH: 1
FEVER: 0
CONFUSION: 1

## 2023-11-20 NOTE — LETTER
Patient: Syed Meyers  : 1945    Encounter Date: 2023    No chief complaint on file.      Subjective  [id number] : Syed Meyers is a 77 y.o. male LTC resident at [facility].   HPI   Pt currently being treated for Covid with course of paxlovid.    He continues to not feel well.   He has some cough and congestion.   He has not been eating or drinking much per nursing reports.   No abdominal pain.  No reports of any n/v/d/c.   He had labs today which showed an increase in his BUN and creatinine  to 42/ 2.3.    No respiratory distress.   He is stable on room air.   Pt is a poor historian due to dementia.  Code status is DNRCC.  Review of Systems   Constitutional:  Positive for appetite change and fatigue. Negative for chills and fever.   Respiratory:  Positive for cough. Negative for shortness of breath and wheezing.    Cardiovascular:  Negative for chest pain and leg swelling.   Gastrointestinal:  Negative for abdominal pain, constipation, diarrhea, nausea and vomiting.   Genitourinary:  Negative for dysuria, frequency and hematuria.   Neurological:  Negative for dizziness, tremors, weakness and headaches.   Psychiatric/Behavioral:  Positive for confusion (due to dementia). The patient is not nervous/anxious.    All other systems reviewed and are negative.      Objective  BP (!) 159/92   Pulse 55   Temp 36.6 °C (97.9 °F)   Resp 18   Wt 84.6 kg (186 lb 9.6 oz)   SpO2 97%   BMI 30.12 kg/m²    Physical Exam  Constitutional:       General: He is not in acute distress.  Eyes:      Conjunctiva/sclera: Conjunctivae normal.      Pupils: Pupils are equal, round, and reactive to light.   Cardiovascular:      Rate and Rhythm: Normal rate and regular rhythm.      Heart sounds: No murmur heard.  Pulmonary:      Effort: Pulmonary effort is normal. No respiratory distress.      Breath sounds: No wheezing, rhonchi or rales.      Comments: Moist cough.  Abdominal:      General: Abdomen is flat. Bowel sounds  are normal. There is no distension.      Palpations: Abdomen is soft. There is no mass.      Tenderness: There is no abdominal tenderness.   Musculoskeletal:         General: No swelling. Normal range of motion.   Skin:     General: Skin is warm and dry.      Findings: No erythema or rash.   Neurological:      General: No focal deficit present.      Mental Status: He is alert. Mental status is at baseline.      Comments: A&O x 1-2       Cerebrovascular accident (CVA) due to embolism of cerebral artery (CMS/HCC) I63.40        With residual cognitive impairment.   Aspirin stopped due to GIB.            CKD (chronic kidney disease) stage 3, GFR 30-59 ml/min (CMS/[facility]) N18.30       KENTRELL on chronic CKD  due to poor po intake.  Hold diuretics.   IV fluids NS @ 75Cc/hr x 1 liter.  Monitor labs as ordered.            Dementia without behavioral disturbance, psychotic disturbance, mood disturbance, or anxiety (CMS/[facility]) F03.90       Continue Donepezil.           H/O deep venous thrombosis Z86.718       IVC filter has been removed.           HTN (hypertension), benign - Primary I10       Continue losartan.  Monitor blood pressures and adjust meds as needed.           Aortic aneurysm (CMS/[facility]) I71.9       Follows with Vascular           Lower extremity edema R60.0       HOLD  torsemide 20mg daily and  potassium chloride 20MEQ daily.   due to poor po intake and dehydration.  Monitor for swelling.           COPD (chronic obstructive pulmonary disease) (CMS/[facility]) J44.9       Continue advair and albuterol prn.       COVID:  Pt tested positive for covid 11/16.   He has cold symptoms with nasal congestion and cough   Poor PO intake over the last couple of days.  Dehydration on labs.   Complete course of Paxlovid 150mg BID x 5 days.  Monitor for symptoms and treat as necessary.          Time spent: 30 min in review of chart, labs and orders, consultation with pt and documentation.       Electronically Signed By: Karol Gant PA-C    11/20/23  3:51 PM

## 2023-11-20 NOTE — PROGRESS NOTES
No chief complaint on file.      Subjective   [id number] : Syed Meyers is a 77 y.o. male LTC resident at [facility].   HPI   Pt currently being treated for Covid with course of paxlovid.    He continues to not feel well.   He has some cough and congestion.   He has not been eating or drinking much per nursing reports.   No abdominal pain.  No reports of any n/v/d/c.   He had labs today which showed an increase in his BUN and creatinine  to 42/ 2.3.    No respiratory distress.   He is stable on room air.   Pt is a poor historian due to dementia.  Code status is DNC.  Review of Systems   Constitutional:  Positive for appetite change and fatigue. Negative for chills and fever.   Respiratory:  Positive for cough. Negative for shortness of breath and wheezing.    Cardiovascular:  Negative for chest pain and leg swelling.   Gastrointestinal:  Negative for abdominal pain, constipation, diarrhea, nausea and vomiting.   Genitourinary:  Negative for dysuria, frequency and hematuria.   Neurological:  Negative for dizziness, tremors, weakness and headaches.   Psychiatric/Behavioral:  Positive for confusion (due to dementia). The patient is not nervous/anxious.    All other systems reviewed and are negative.      Objective   BP (!) 159/92   Pulse 55   Temp 36.6 °C (97.9 °F)   Resp 18   Wt 84.6 kg (186 lb 9.6 oz)   SpO2 97%   BMI 30.12 kg/m²    Physical Exam  Constitutional:       General: He is not in acute distress.  Eyes:      Conjunctiva/sclera: Conjunctivae normal.      Pupils: Pupils are equal, round, and reactive to light.   Cardiovascular:      Rate and Rhythm: Normal rate and regular rhythm.      Heart sounds: No murmur heard.  Pulmonary:      Effort: Pulmonary effort is normal. No respiratory distress.      Breath sounds: No wheezing, rhonchi or rales.      Comments: Moist cough.  Abdominal:      General: Abdomen is flat. Bowel sounds are normal. There is no distension.      Palpations: Abdomen is soft.  There is no mass.      Tenderness: There is no abdominal tenderness.   Musculoskeletal:         General: No swelling. Normal range of motion.   Skin:     General: Skin is warm and dry.      Findings: No erythema or rash.   Neurological:      General: No focal deficit present.      Mental Status: He is alert. Mental status is at baseline.      Comments: A&O x 1-2       Cerebrovascular accident (CVA) due to embolism of cerebral artery (CMS/HCC) I63.40        With residual cognitive impairment.   Aspirin stopped due to GIB.            CKD (chronic kidney disease) stage 3, GFR 30-59 ml/min (CMS/[facility]) N18.30       KENTRELL on chronic CKD  due to poor po intake.  Hold diuretics.   IV fluids NS @ 75Cc/hr x 1 liter.  Monitor labs as ordered.            Dementia without behavioral disturbance, psychotic disturbance, mood disturbance, or anxiety (CMS/[facility]) F03.90       Continue Donepezil.           H/O deep venous thrombosis Z86.718       IVC filter has been removed.           HTN (hypertension), benign - Primary I10       Continue losartan.  Monitor blood pressures and adjust meds as needed.           Aortic aneurysm (CMS/[facility]) I71.9       Follows with Vascular           Lower extremity edema R60.0       HOLD  torsemide 20mg daily and  potassium chloride 20MEQ daily.   due to poor po intake and dehydration.  Monitor for swelling.           COPD (chronic obstructive pulmonary disease) (CMS/[facility]) J44.9       Continue advair and albuterol prn.       COVID:  Pt tested positive for covid 11/16.   He has cold symptoms with nasal congestion and cough   Poor PO intake over the last couple of days.  Dehydration on labs.   Complete course of Paxlovid 150mg BID x 5 days.  Monitor for symptoms and treat as necessary.          Time spent: 30 min in review of chart, labs and orders, consultation with pt and documentation.

## 2023-11-21 ENCOUNTER — LAB REQUISITION (OUTPATIENT)
Dept: LAB | Facility: HOSPITAL | Age: 78
End: 2023-11-21
Payer: MEDICARE

## 2023-11-21 DIAGNOSIS — N18.30 CHRONIC KIDNEY DISEASE, STAGE 3 UNSPECIFIED (MULTI): ICD-10-CM

## 2023-11-21 DIAGNOSIS — F03.90 UNSPECIFIED DEMENTIA, UNSPECIFIED SEVERITY, WITHOUT BEHAVIORAL DISTURBANCE, PSYCHOTIC DISTURBANCE, MOOD DISTURBANCE, AND ANXIETY (MULTI): ICD-10-CM

## 2023-11-21 LAB
ANION GAP SERPL CALC-SCNC: 9 MMOL/L
BUN SERPL-MCNC: 31 MG/DL (ref 8–25)
CALCIUM SERPL-MCNC: 8.9 MG/DL (ref 8.5–10.4)
CHLORIDE SERPL-SCNC: 108 MMOL/L (ref 97–107)
CO2 SERPL-SCNC: 27 MMOL/L (ref 24–31)
CREAT SERPL-MCNC: 1.5 MG/DL (ref 0.4–1.6)
GFR SERPL CREATININE-BSD FRML MDRD: 47 ML/MIN/1.73M*2
GLUCOSE SERPL-MCNC: 120 MG/DL (ref 65–99)
POTASSIUM SERPL-SCNC: 4.5 MMOL/L (ref 3.4–5.1)
SODIUM SERPL-SCNC: 144 MMOL/L (ref 133–145)

## 2023-11-21 PROCEDURE — 80048 BASIC METABOLIC PNL TOTAL CA: CPT | Mod: OUT | Performed by: INTERNAL MEDICINE

## 2023-12-11 ENCOUNTER — LAB REQUISITION (OUTPATIENT)
Dept: LAB | Facility: HOSPITAL | Age: 78
End: 2023-12-11
Payer: MEDICARE

## 2023-12-11 LAB
ANION GAP SERPL CALC-SCNC: 7 MMOL/L
BUN SERPL-MCNC: 27 MG/DL (ref 8–25)
CALCIUM SERPL-MCNC: 8.2 MG/DL (ref 8.5–10.4)
CHLORIDE SERPL-SCNC: 110 MMOL/L (ref 97–107)
CO2 SERPL-SCNC: 26 MMOL/L (ref 24–31)
CREAT SERPL-MCNC: 1.5 MG/DL (ref 0.4–1.6)
GFR SERPL CREATININE-BSD FRML MDRD: 47 ML/MIN/1.73M*2
GLUCOSE SERPL-MCNC: 84 MG/DL (ref 65–99)
POTASSIUM SERPL-SCNC: 4.2 MMOL/L (ref 3.4–5.1)
SODIUM SERPL-SCNC: 143 MMOL/L (ref 133–145)

## 2023-12-11 PROCEDURE — 80048 BASIC METABOLIC PNL TOTAL CA: CPT | Mod: OUT | Performed by: INTERNAL MEDICINE

## 2023-12-19 ENCOUNTER — LAB REQUISITION (OUTPATIENT)
Dept: LAB | Facility: HOSPITAL | Age: 78
End: 2023-12-19
Payer: MEDICARE

## 2023-12-19 DIAGNOSIS — F03.90 UNSPECIFIED DEMENTIA, UNSPECIFIED SEVERITY, WITHOUT BEHAVIORAL DISTURBANCE, PSYCHOTIC DISTURBANCE, MOOD DISTURBANCE, AND ANXIETY (MULTI): ICD-10-CM

## 2023-12-19 DIAGNOSIS — J44.9 CHRONIC OBSTRUCTIVE PULMONARY DISEASE, UNSPECIFIED (MULTI): ICD-10-CM

## 2023-12-19 DIAGNOSIS — R41.0 DISORIENTATION, UNSPECIFIED: ICD-10-CM

## 2023-12-19 DIAGNOSIS — I10 ESSENTIAL (PRIMARY) HYPERTENSION: ICD-10-CM

## 2023-12-19 DIAGNOSIS — N18.30 CHRONIC KIDNEY DISEASE, STAGE 3 UNSPECIFIED (MULTI): ICD-10-CM

## 2023-12-19 LAB
ANION GAP SERPL CALC-SCNC: 8 MMOL/L
BASOPHILS # BLD AUTO: 0.03 X10*3/UL (ref 0–0.1)
BASOPHILS NFR BLD AUTO: 0.8 %
BUN SERPL-MCNC: 31 MG/DL (ref 8–25)
CALCIUM SERPL-MCNC: 8.4 MG/DL (ref 8.5–10.4)
CHLORIDE SERPL-SCNC: 103 MMOL/L (ref 97–107)
CO2 SERPL-SCNC: 29 MMOL/L (ref 24–31)
CREAT SERPL-MCNC: 1.8 MG/DL (ref 0.4–1.6)
EOSINOPHIL # BLD AUTO: 0.2 X10*3/UL (ref 0–0.4)
EOSINOPHIL NFR BLD AUTO: 5.3 %
ERYTHROCYTE [DISTWIDTH] IN BLOOD BY AUTOMATED COUNT: 15.9 % (ref 11.5–14.5)
GFR SERPL CREATININE-BSD FRML MDRD: 38 ML/MIN/1.73M*2
GLUCOSE SERPL-MCNC: 93 MG/DL (ref 65–99)
HCT VFR BLD AUTO: 36.2 % (ref 41–52)
HGB BLD-MCNC: 11.4 G/DL (ref 13.5–17.5)
IMM GRANULOCYTES # BLD AUTO: 0.02 X10*3/UL (ref 0–0.5)
IMM GRANULOCYTES NFR BLD AUTO: 0.5 % (ref 0–0.9)
LYMPHOCYTES # BLD AUTO: 1.18 X10*3/UL (ref 0.8–3)
LYMPHOCYTES NFR BLD AUTO: 31.4 %
MCH RBC QN AUTO: 27.7 PG (ref 26–34)
MCHC RBC AUTO-ENTMCNC: 31.5 G/DL (ref 32–36)
MCV RBC AUTO: 88 FL (ref 80–100)
MONOCYTES # BLD AUTO: 0.6 X10*3/UL (ref 0.05–0.8)
MONOCYTES NFR BLD AUTO: 16 %
NEUTROPHILS # BLD AUTO: 1.73 X10*3/UL (ref 1.6–5.5)
NEUTROPHILS NFR BLD AUTO: 46 %
NRBC BLD-RTO: 0 /100 WBCS (ref 0–0)
PLATELET # BLD AUTO: 153 X10*3/UL (ref 150–450)
POTASSIUM SERPL-SCNC: 3.5 MMOL/L (ref 3.4–5.1)
RBC # BLD AUTO: 4.11 X10*6/UL (ref 4.5–5.9)
SODIUM SERPL-SCNC: 140 MMOL/L (ref 133–145)
WBC # BLD AUTO: 3.8 X10*3/UL (ref 4.4–11.3)

## 2023-12-19 PROCEDURE — 80048 BASIC METABOLIC PNL TOTAL CA: CPT | Mod: OUT | Performed by: INTERNAL MEDICINE

## 2023-12-19 PROCEDURE — 85025 COMPLETE CBC W/AUTO DIFF WBC: CPT | Mod: OUT | Performed by: INTERNAL MEDICINE

## 2024-01-08 ENCOUNTER — NURSING HOME VISIT (OUTPATIENT)
Dept: POST ACUTE CARE | Facility: EXTERNAL LOCATION | Age: 79
End: 2024-01-08
Payer: COMMERCIAL

## 2024-01-08 VITALS
SYSTOLIC BLOOD PRESSURE: 113 MMHG | HEART RATE: 74 BPM | RESPIRATION RATE: 18 BRPM | TEMPERATURE: 97.9 F | DIASTOLIC BLOOD PRESSURE: 73 MMHG | WEIGHT: 180.2 LBS | BODY MASS INDEX: 29.09 KG/M2 | OXYGEN SATURATION: 97 %

## 2024-01-08 DIAGNOSIS — R60.0 LOWER EXTREMITY EDEMA: ICD-10-CM

## 2024-01-08 DIAGNOSIS — I10 HTN (HYPERTENSION), BENIGN: ICD-10-CM

## 2024-01-08 DIAGNOSIS — J44.1 COPD EXACERBATION (MULTI): ICD-10-CM

## 2024-01-08 DIAGNOSIS — I63.40 CEREBROVASCULAR ACCIDENT (CVA) DUE TO EMBOLISM OF CEREBRAL ARTERY (MULTI): Primary | ICD-10-CM

## 2024-01-08 DIAGNOSIS — I71.9 AORTIC ANEURYSM WITHOUT RUPTURE, UNSPECIFIED PORTION OF AORTA (CMS-HCC): ICD-10-CM

## 2024-01-08 DIAGNOSIS — Z86.718 H/O DEEP VENOUS THROMBOSIS: ICD-10-CM

## 2024-01-08 DIAGNOSIS — N18.30 STAGE 3 CHRONIC KIDNEY DISEASE, UNSPECIFIED WHETHER STAGE 3A OR 3B CKD (MULTI): ICD-10-CM

## 2024-01-08 DIAGNOSIS — F01.B0 MODERATE VASCULAR DEMENTIA WITHOUT BEHAVIORAL DISTURBANCE, PSYCHOTIC DISTURBANCE, MOOD DISTURBANCE, OR ANXIETY (MULTI): ICD-10-CM

## 2024-01-08 PROCEDURE — 99309 SBSQ NF CARE MODERATE MDM 30: CPT | Performed by: PHYSICIAN ASSISTANT

## 2024-01-08 ASSESSMENT — ENCOUNTER SYMPTOMS
FREQUENCY: 0
FATIGUE: 0
DIZZINESS: 0
DIARRHEA: 0
NAUSEA: 0
SHORTNESS OF BREATH: 0
NERVOUS/ANXIOUS: 0
CONSTIPATION: 0
VOMITING: 0
FEVER: 0
HEADACHES: 0
DYSURIA: 0
CHILLS: 0
ABDOMINAL PAIN: 0
APPETITE CHANGE: 0
COUGH: 0
WHEEZING: 0
TREMORS: 0
WEAKNESS: 0
HEMATURIA: 0
CONFUSION: 1

## 2024-01-08 NOTE — PROGRESS NOTES
No chief complaint on file.      Subjective   [id number] : Syed Meyers is a 78 y.o. male LTC resident at [facility].   HPI   Pt with h/o CVA, COPD, and dementia seen in routine follow up.  Pt has been doing well and appears to be at baseline functionally.   He is pleasant and interactive.   He has some chronic lower leg edema in his feet.  Weight is down a few pounds.   Pt appetite is good and he consistently eats about % of meals.   He offers no complaints.   He is a poor historian due to his underlying dementia.    No abdominal pain.  No reports of any n/v/d/c.   No respiratory distress.  No shortness of breath or cough noted.   He is stable on room air.   Code status is DNRCC.  Review of Systems   Constitutional:  Negative for appetite change, chills, fatigue and fever.   Respiratory:  Negative for cough, shortness of breath and wheezing.    Cardiovascular:  Positive for leg swelling. Negative for chest pain.   Gastrointestinal:  Negative for abdominal pain, constipation, diarrhea, nausea and vomiting.   Genitourinary:  Negative for dysuria, frequency and hematuria.   Neurological:  Negative for dizziness, tremors, weakness and headaches.   Psychiatric/Behavioral:  Positive for confusion (due to dementia). The patient is not nervous/anxious.    All other systems reviewed and are negative.      Objective   /73   Pulse 74   Temp 36.6 °C (97.9 °F)   Resp 18   Wt 81.7 kg (180 lb 3.2 oz)   SpO2 97%   BMI 29.09 kg/m²    Physical Exam  Constitutional:       General: He is not in acute distress.  Eyes:      Conjunctiva/sclera: Conjunctivae normal.      Pupils: Pupils are equal, round, and reactive to light.   Cardiovascular:      Rate and Rhythm: Normal rate and regular rhythm.      Heart sounds: No murmur heard.  Pulmonary:      Effort: Pulmonary effort is normal. No respiratory distress.      Breath sounds: No wheezing, rhonchi or rales.   Abdominal:      General: Abdomen is flat. Bowel sounds  are normal. There is no distension.      Palpations: Abdomen is soft. There is no mass.      Tenderness: There is no abdominal tenderness.   Musculoskeletal:         General: Swelling (1-2+ pitting edema to the top of his feet.  no lower leg or ankle edema.) present. Normal range of motion.   Skin:     General: Skin is warm and dry.      Findings: No erythema or rash.   Neurological:      General: No focal deficit present.      Mental Status: He is alert. Mental status is at baseline.      Comments: A&O x 1-2       Cerebrovascular accident (CVA) due to embolism of cerebral artery (CMS/[facility]) I63.40        With residual cognitive impairment.   Aspirin stopped due to GIB.            CKD (chronic kidney disease) stage 3, GFR 30-59 ml/min (CMS/[facility]) N18.30       Stable - monitoring monthly labs.            Dementia without behavioral disturbance, psychotic disturbance, mood disturbance, or anxiety (CMS/[facility]) F03.90       Continue Donepezil.           H/O deep venous thrombosis Z86.718       IVC filter has been removed.           HTN (hypertension), benign - Primary I10       Continue losartan.  Monitor blood pressures and adjust meds as needed.           Aortic aneurysm (CMS/[facility]) I71.9       Follows with Vascular           Lower extremity edema R60.0       He is on lasix 40mg daily.   Monitor for change in swelling.  Nursing does ace wrap legs daily.            COPD (chronic obstructive pulmonary disease) (CMS/[facility]) J44.9       Continue advair and albuterol prn.               Time spent: 30 min in review of chart, labs and orders, consultation with pt and documentation.

## 2024-01-08 NOTE — LETTER
Patient: Syed Meyers  : 1945    Encounter Date: 2024    No chief complaint on file.      Subjective  [id number] : Syed Meyers is a 78 y.o. male LTC resident at [facility].   HPI   Pt with h/o CVA, COPD, and dementia seen in routine follow up.  Pt has been doing well and appears to be at baseline functionally.   He is pleasant and interactive.   He has some chronic lower leg edema in his feet.  Weight is down a few pounds.   Pt appetite is good and he consistently eats about % of meals.   He offers no complaints.   He is a poor historian due to his underlying dementia.    No abdominal pain.  No reports of any n/v/d/c.   No respiratory distress.  No shortness of breath or cough noted.   He is stable on room air.   Code status is DNRCC.  Review of Systems   Constitutional:  Negative for appetite change, chills, fatigue and fever.   Respiratory:  Negative for cough, shortness of breath and wheezing.    Cardiovascular:  Positive for leg swelling. Negative for chest pain.   Gastrointestinal:  Negative for abdominal pain, constipation, diarrhea, nausea and vomiting.   Genitourinary:  Negative for dysuria, frequency and hematuria.   Neurological:  Negative for dizziness, tremors, weakness and headaches.   Psychiatric/Behavioral:  Positive for confusion (due to dementia). The patient is not nervous/anxious.    All other systems reviewed and are negative.      Objective  /73   Pulse 74   Temp 36.6 °C (97.9 °F)   Resp 18   Wt 81.7 kg (180 lb 3.2 oz)   SpO2 97%   BMI 29.09 kg/m²    Physical Exam  Constitutional:       General: He is not in acute distress.  Eyes:      Conjunctiva/sclera: Conjunctivae normal.      Pupils: Pupils are equal, round, and reactive to light.   Cardiovascular:      Rate and Rhythm: Normal rate and regular rhythm.      Heart sounds: No murmur heard.  Pulmonary:      Effort: Pulmonary effort is normal. No respiratory distress.      Breath sounds: No wheezing,  rhonchi or rales.   Abdominal:      General: Abdomen is flat. Bowel sounds are normal. There is no distension.      Palpations: Abdomen is soft. There is no mass.      Tenderness: There is no abdominal tenderness.   Musculoskeletal:         General: Swelling (1-2+ pitting edema to the top of his feet.  no lower leg or ankle edema.) present. Normal range of motion.   Skin:     General: Skin is warm and dry.      Findings: No erythema or rash.   Neurological:      General: No focal deficit present.      Mental Status: He is alert. Mental status is at baseline.      Comments: A&O x 1-2       Cerebrovascular accident (CVA) due to embolism of cerebral artery (CMS/[facility]) I63.40        With residual cognitive impairment.   Aspirin stopped due to GIB.            CKD (chronic kidney disease) stage 3, GFR 30-59 ml/min (CMS/[facility]) N18.30       Stable - monitoring monthly labs.            Dementia without behavioral disturbance, psychotic disturbance, mood disturbance, or anxiety (CMS/[facility]) F03.90       Continue Donepezil.           H/O deep venous thrombosis Z86.718       IVC filter has been removed.           HTN (hypertension), benign - Primary I10       Continue losartan.  Monitor blood pressures and adjust meds as needed.           Aortic aneurysm (CMS/[facility]) I71.9       Follows with Vascular           Lower extremity edema R60.0       He is on lasix 40mg daily.   Monitor for change in swelling.  Nursing does ace wrap legs daily.            COPD (chronic obstructive pulmonary disease) (CMS/[facility]) J44.9       Continue advair and albuterol prn.               Time spent: 30 min in review of chart, labs and orders, consultation with pt and documentation.     Electronically Signed By: Karol Gant PA-C   1/8/24 10:43 AM

## 2024-01-11 ENCOUNTER — LAB REQUISITION (OUTPATIENT)
Dept: LAB | Facility: HOSPITAL | Age: 79
End: 2024-01-11
Payer: COMMERCIAL

## 2024-01-11 DIAGNOSIS — R41.0 DISORIENTATION, UNSPECIFIED: ICD-10-CM

## 2024-01-11 DIAGNOSIS — N18.30 CHRONIC KIDNEY DISEASE, STAGE 3 UNSPECIFIED (MULTI): ICD-10-CM

## 2024-01-11 DIAGNOSIS — I10 ESSENTIAL (PRIMARY) HYPERTENSION: ICD-10-CM

## 2024-01-11 DIAGNOSIS — F03.90 UNSPECIFIED DEMENTIA, UNSPECIFIED SEVERITY, WITHOUT BEHAVIORAL DISTURBANCE, PSYCHOTIC DISTURBANCE, MOOD DISTURBANCE, AND ANXIETY (MULTI): ICD-10-CM

## 2024-01-11 DIAGNOSIS — J44.9 CHRONIC OBSTRUCTIVE PULMONARY DISEASE, UNSPECIFIED (MULTI): ICD-10-CM

## 2024-01-11 LAB
ANION GAP SERPL CALC-SCNC: 10 MMOL/L
BUN SERPL-MCNC: 23 MG/DL (ref 8–25)
CALCIUM SERPL-MCNC: 8.9 MG/DL (ref 8.5–10.4)
CHLORIDE SERPL-SCNC: 106 MMOL/L (ref 97–107)
CO2 SERPL-SCNC: 27 MMOL/L (ref 24–31)
CREAT SERPL-MCNC: 1.9 MG/DL (ref 0.4–1.6)
EGFRCR SERPLBLD CKD-EPI 2021: 35 ML/MIN/1.73M*2
ERYTHROCYTE [DISTWIDTH] IN BLOOD BY AUTOMATED COUNT: 15.7 % (ref 11.5–14.5)
GLUCOSE SERPL-MCNC: 82 MG/DL (ref 65–99)
HCT VFR BLD AUTO: 39.4 % (ref 41–52)
HGB BLD-MCNC: 12.3 G/DL (ref 13.5–17.5)
MCH RBC QN AUTO: 27.3 PG (ref 26–34)
MCHC RBC AUTO-ENTMCNC: 31.2 G/DL (ref 32–36)
MCV RBC AUTO: 88 FL (ref 80–100)
NRBC BLD-RTO: 0 /100 WBCS (ref 0–0)
PLATELET # BLD AUTO: 171 X10*3/UL (ref 150–450)
POTASSIUM SERPL-SCNC: 3.8 MMOL/L (ref 3.4–5.1)
RBC # BLD AUTO: 4.5 X10*6/UL (ref 4.5–5.9)
SODIUM SERPL-SCNC: 143 MMOL/L (ref 133–145)
WBC # BLD AUTO: 4.8 X10*3/UL (ref 4.4–11.3)

## 2024-01-11 PROCEDURE — 85027 COMPLETE CBC AUTOMATED: CPT | Mod: OUT | Performed by: INTERNAL MEDICINE

## 2024-01-11 PROCEDURE — 80048 BASIC METABOLIC PNL TOTAL CA: CPT | Mod: OUT | Performed by: INTERNAL MEDICINE

## 2024-01-18 ENCOUNTER — LAB REQUISITION (OUTPATIENT)
Dept: LAB | Facility: HOSPITAL | Age: 79
End: 2024-01-18
Payer: COMMERCIAL

## 2024-01-18 ENCOUNTER — NURSING HOME VISIT (OUTPATIENT)
Dept: POST ACUTE CARE | Facility: EXTERNAL LOCATION | Age: 79
End: 2024-01-18
Payer: COMMERCIAL

## 2024-01-18 VITALS
HEART RATE: 81 BPM | SYSTOLIC BLOOD PRESSURE: 122 MMHG | RESPIRATION RATE: 18 BRPM | TEMPERATURE: 98.2 F | WEIGHT: 180.2 LBS | DIASTOLIC BLOOD PRESSURE: 75 MMHG | BODY MASS INDEX: 29.09 KG/M2 | OXYGEN SATURATION: 96 %

## 2024-01-18 DIAGNOSIS — I71.9 AORTIC ANEURYSM WITHOUT RUPTURE, UNSPECIFIED PORTION OF AORTA (CMS-HCC): ICD-10-CM

## 2024-01-18 DIAGNOSIS — I10 ESSENTIAL (PRIMARY) HYPERTENSION: ICD-10-CM

## 2024-01-18 DIAGNOSIS — F01.B0 MODERATE VASCULAR DEMENTIA WITHOUT BEHAVIORAL DISTURBANCE, PSYCHOTIC DISTURBANCE, MOOD DISTURBANCE, OR ANXIETY (MULTI): ICD-10-CM

## 2024-01-18 DIAGNOSIS — J44.9 CHRONIC OBSTRUCTIVE PULMONARY DISEASE, UNSPECIFIED (MULTI): ICD-10-CM

## 2024-01-18 DIAGNOSIS — N18.30 CHRONIC KIDNEY DISEASE, STAGE 3 UNSPECIFIED (MULTI): ICD-10-CM

## 2024-01-18 DIAGNOSIS — R41.0 DISORIENTATION, UNSPECIFIED: ICD-10-CM

## 2024-01-18 DIAGNOSIS — N17.9 AKI (ACUTE KIDNEY INJURY) (CMS-HCC): Primary | ICD-10-CM

## 2024-01-18 DIAGNOSIS — Z86.718 H/O DEEP VENOUS THROMBOSIS: ICD-10-CM

## 2024-01-18 DIAGNOSIS — I63.40 CEREBROVASCULAR ACCIDENT (CVA) DUE TO EMBOLISM OF CEREBRAL ARTERY (MULTI): ICD-10-CM

## 2024-01-18 DIAGNOSIS — I10 HTN (HYPERTENSION), BENIGN: ICD-10-CM

## 2024-01-18 DIAGNOSIS — J44.1 COPD EXACERBATION (MULTI): ICD-10-CM

## 2024-01-18 DIAGNOSIS — F03.90 UNSPECIFIED DEMENTIA, UNSPECIFIED SEVERITY, WITHOUT BEHAVIORAL DISTURBANCE, PSYCHOTIC DISTURBANCE, MOOD DISTURBANCE, AND ANXIETY (MULTI): ICD-10-CM

## 2024-01-18 LAB
ANION GAP SERPL CALC-SCNC: 13 MMOL/L
BUN SERPL-MCNC: 29 MG/DL (ref 8–25)
CALCIUM SERPL-MCNC: 8.6 MG/DL (ref 8.5–10.4)
CHLORIDE SERPL-SCNC: 105 MMOL/L (ref 97–107)
CO2 SERPL-SCNC: 26 MMOL/L (ref 24–31)
CREAT SERPL-MCNC: 2.1 MG/DL (ref 0.4–1.6)
EGFRCR SERPLBLD CKD-EPI 2021: 31 ML/MIN/1.73M*2
ERYTHROCYTE [DISTWIDTH] IN BLOOD BY AUTOMATED COUNT: 15.5 % (ref 11.5–14.5)
GLUCOSE SERPL-MCNC: 76 MG/DL (ref 65–99)
HCT VFR BLD AUTO: 37.8 % (ref 41–52)
HGB BLD-MCNC: 12.1 G/DL (ref 13.5–17.5)
MCH RBC QN AUTO: 27.4 PG (ref 26–34)
MCHC RBC AUTO-ENTMCNC: 32 G/DL (ref 32–36)
MCV RBC AUTO: 86 FL (ref 80–100)
NRBC BLD-RTO: 0 /100 WBCS (ref 0–0)
PLATELET # BLD AUTO: 151 X10*3/UL (ref 150–450)
POTASSIUM SERPL-SCNC: 3.9 MMOL/L (ref 3.4–5.1)
RBC # BLD AUTO: 4.41 X10*6/UL (ref 4.5–5.9)
SODIUM SERPL-SCNC: 144 MMOL/L (ref 133–145)
WBC # BLD AUTO: 3.7 X10*3/UL (ref 4.4–11.3)

## 2024-01-18 PROCEDURE — 85027 COMPLETE CBC AUTOMATED: CPT | Mod: OUT | Performed by: INTERNAL MEDICINE

## 2024-01-18 PROCEDURE — 99309 SBSQ NF CARE MODERATE MDM 30: CPT | Performed by: PHYSICIAN ASSISTANT

## 2024-01-18 PROCEDURE — 80048 BASIC METABOLIC PNL TOTAL CA: CPT | Mod: OUT | Performed by: INTERNAL MEDICINE

## 2024-01-18 ASSESSMENT — ENCOUNTER SYMPTOMS
FATIGUE: 0
SHORTNESS OF BREATH: 0
NAUSEA: 0
TREMORS: 0
CHILLS: 0
WHEEZING: 0
CONFUSION: 1
DIZZINESS: 0
WEAKNESS: 0
FEVER: 0
VOMITING: 0
DYSURIA: 0
ABDOMINAL PAIN: 0
HEMATURIA: 0
DIARRHEA: 0
ARTHRALGIAS: 1
FREQUENCY: 0
COUGH: 0
APPETITE CHANGE: 0
NERVOUS/ANXIOUS: 0
CONSTIPATION: 0
HEADACHES: 0

## 2024-01-18 NOTE — PROGRESS NOTES
No chief complaint on file.      Subjective   [id number] : Syed Meyers is a 78 y.o. male LTC resident at [facility].   HPI   Pt with h/o CVA, COPD, and dementia.   Pt had routine labs today which showed a mild KENTRELL with bun of 29 and creatinine of 2.1.   Pt is on diuretic - lasix 40mg daily.   Pt has had decreased po intake over last couple of weeks.  He has had a weight loss.   Vitals are stable.   He is seen while sitting up in wheelchair.  He is alert, pleasant and interactive.  He report right elbow pain which is being address with pain management.   He denies any shortness of breath or cough.   He has no lower leg edema noted this morning.   He is a poor historian due to his underlying dementia.   No new issues noted by nursing.  No abdominal pain.  No reports of any n/v/d/c.  Code status is DNRCC.  Review of Systems   Constitutional:  Negative for appetite change, chills, fatigue and fever.   Respiratory:  Negative for cough, shortness of breath and wheezing.    Cardiovascular:  Negative for chest pain and leg swelling.   Gastrointestinal:  Negative for abdominal pain, constipation, diarrhea, nausea and vomiting.   Genitourinary:  Negative for dysuria, frequency and hematuria.   Musculoskeletal:  Positive for arthralgias (right arm/elbow pain).   Neurological:  Negative for dizziness, tremors, weakness and headaches.   Psychiatric/Behavioral:  Positive for confusion (due to dementia). The patient is not nervous/anxious.    All other systems reviewed and are negative.      Objective   /75   Pulse 81   Temp 36.8 °C (98.2 °F)   Resp 18   Wt 81.7 kg (180 lb 3.2 oz)   SpO2 96%   BMI 29.09 kg/m²    Physical Exam  Constitutional:       General: He is not in acute distress.  Eyes:      Conjunctiva/sclera: Conjunctivae normal.      Pupils: Pupils are equal, round, and reactive to light.   Cardiovascular:      Rate and Rhythm: Normal rate and regular rhythm.      Heart sounds: No murmur  heard.  Pulmonary:      Effort: Pulmonary effort is normal. No respiratory distress.      Breath sounds: No wheezing, rhonchi or rales.   Abdominal:      General: Abdomen is flat. Bowel sounds are normal. There is no distension.      Palpations: Abdomen is soft. There is no mass.      Tenderness: There is no abdominal tenderness.   Musculoskeletal:         General: No swelling (.). Normal range of motion.   Skin:     General: Skin is warm and dry.      Findings: No erythema or rash.   Neurological:      General: No focal deficit present.      Mental Status: He is alert. Mental status is at baseline.      Comments: A&O x 1-2       Cerebrovascular accident (CVA) due to embolism of cerebral artery (CMS/[facility]) I63.40        With residual cognitive impairment.   Aspirin stopped due to GIB.            CKD (chronic kidney disease) stage 3, GFR 30-59 ml/min (CMS/[facility]) N18.30       Stable - monitoring monthly labs.            Dementia without behavioral disturbance, psychotic disturbance, mood disturbance, or anxiety (CMS/[facility]) F03.90       Continue Donepezil.           H/O deep venous thrombosis Z86.718       IVC filter has been removed.           HTN (hypertension), benign - Primary I10       Continue losartan.  Monitor blood pressures and adjust meds as needed.           Aortic aneurysm (CMS/[facility]) I71.9       Follows with Vascular           Lower extremity edema R60.0       Improved.  NO edema noted.    Monitor for change in swelling.  Nursing does ace wrap legs daily.            COPD (chronic obstructive pulmonary disease) (CMS/[facility]) J44.9       Continue advair and albuterol prn.      KENTRELL:  stop lasix.   Appears stable.  Decline in po intake.   Recheck labs in couple days.  Consider IVF if change in condition or vitals.          Time spent: 30 min in review of chart, labs and orders, consultation with pt and documentation.

## 2024-01-18 NOTE — LETTER
Patient: Syed Meyers  : 1945    Encounter Date: 2024    No chief complaint on file.      Subjective  [id number] : Syed Meyers is a 78 y.o. male LTC resident at [facility].   HPI   Pt with h/o CVA, COPD, and dementia.   Pt had routine labs today which showed a mild KENTRELL with bun of 29 and creatinine of 2.1.   Pt is on diuretic - lasix 40mg daily.   Pt has had decreased po intake over last couple of weeks.  He has had a weight loss.   Vitals are stable.   He is seen while sitting up in wheelchair.  He is alert, pleasant and interactive.  He report right elbow pain which is being address with pain management.   He denies any shortness of breath or cough.   He has no lower leg edema noted this morning.   He is a poor historian due to his underlying dementia.   No new issues noted by nursing.  No abdominal pain.  No reports of any n/v/d/c.  Code status is DNRCC.  Review of Systems   Constitutional:  Negative for appetite change, chills, fatigue and fever.   Respiratory:  Negative for cough, shortness of breath and wheezing.    Cardiovascular:  Negative for chest pain and leg swelling.   Gastrointestinal:  Negative for abdominal pain, constipation, diarrhea, nausea and vomiting.   Genitourinary:  Negative for dysuria, frequency and hematuria.   Musculoskeletal:  Positive for arthralgias (right arm/elbow pain).   Neurological:  Negative for dizziness, tremors, weakness and headaches.   Psychiatric/Behavioral:  Positive for confusion (due to dementia). The patient is not nervous/anxious.    All other systems reviewed and are negative.      Objective  /75   Pulse 81   Temp 36.8 °C (98.2 °F)   Resp 18   Wt 81.7 kg (180 lb 3.2 oz)   SpO2 96%   BMI 29.09 kg/m²    Physical Exam  Constitutional:       General: He is not in acute distress.  Eyes:      Conjunctiva/sclera: Conjunctivae normal.      Pupils: Pupils are equal, round, and reactive to light.   Cardiovascular:      Rate and Rhythm:  Normal rate and regular rhythm.      Heart sounds: No murmur heard.  Pulmonary:      Effort: Pulmonary effort is normal. No respiratory distress.      Breath sounds: No wheezing, rhonchi or rales.   Abdominal:      General: Abdomen is flat. Bowel sounds are normal. There is no distension.      Palpations: Abdomen is soft. There is no mass.      Tenderness: There is no abdominal tenderness.   Musculoskeletal:         General: No swelling (.). Normal range of motion.   Skin:     General: Skin is warm and dry.      Findings: No erythema or rash.   Neurological:      General: No focal deficit present.      Mental Status: He is alert. Mental status is at baseline.      Comments: A&O x 1-2       Cerebrovascular accident (CVA) due to embolism of cerebral artery (CMS/[facility]) I63.40        With residual cognitive impairment.   Aspirin stopped due to GIB.            CKD (chronic kidney disease) stage 3, GFR 30-59 ml/min (CMS/[facility]) N18.30       Stable - monitoring monthly labs.            Dementia without behavioral disturbance, psychotic disturbance, mood disturbance, or anxiety (CMS/[facility]) F03.90       Continue Donepezil.           H/O deep venous thrombosis Z86.718       IVC filter has been removed.           HTN (hypertension), benign - Primary I10       Continue losartan.  Monitor blood pressures and adjust meds as needed.           Aortic aneurysm (CMS/[facility]) I71.9       Follows with Vascular           Lower extremity edema R60.0       Improved.  NO edema noted.    Monitor for change in swelling.  Nursing does ace wrap legs daily.            COPD (chronic obstructive pulmonary disease) (CMS/[facility]) J44.9       Continue advair and albuterol prn.      KENTRELL:  stop lasix.   Appears stable.  Decline in po intake.   Recheck labs in couple days.  Consider IVF if change in condition or vitals.          Time spent: 30 min in review of chart, labs and orders, consultation with pt and documentation.       Electronically Signed By: Karol BROOKE  RADHA Gant   1/18/24  9:30 PM

## 2024-01-22 ENCOUNTER — LAB REQUISITION (OUTPATIENT)
Dept: LAB | Facility: HOSPITAL | Age: 79
End: 2024-01-22
Payer: COMMERCIAL

## 2024-01-22 DIAGNOSIS — F03.90 UNSPECIFIED DEMENTIA, UNSPECIFIED SEVERITY, WITHOUT BEHAVIORAL DISTURBANCE, PSYCHOTIC DISTURBANCE, MOOD DISTURBANCE, AND ANXIETY (MULTI): ICD-10-CM

## 2024-01-22 DIAGNOSIS — I10 ESSENTIAL (PRIMARY) HYPERTENSION: ICD-10-CM

## 2024-01-22 DIAGNOSIS — R41.0 DISORIENTATION, UNSPECIFIED: ICD-10-CM

## 2024-01-22 DIAGNOSIS — N18.30 CHRONIC KIDNEY DISEASE, STAGE 3 UNSPECIFIED (MULTI): ICD-10-CM

## 2024-01-22 DIAGNOSIS — J44.9 CHRONIC OBSTRUCTIVE PULMONARY DISEASE, UNSPECIFIED (MULTI): ICD-10-CM

## 2024-01-22 LAB
ANION GAP SERPL CALC-SCNC: 10 MMOL/L
BUN SERPL-MCNC: 27 MG/DL (ref 8–25)
CALCIUM SERPL-MCNC: 8.6 MG/DL (ref 8.5–10.4)
CHLORIDE SERPL-SCNC: 106 MMOL/L (ref 97–107)
CO2 SERPL-SCNC: 27 MMOL/L (ref 24–31)
CREAT SERPL-MCNC: 1.8 MG/DL (ref 0.4–1.6)
EGFRCR SERPLBLD CKD-EPI 2021: 38 ML/MIN/1.73M*2
GLUCOSE SERPL-MCNC: 81 MG/DL (ref 65–99)
POTASSIUM SERPL-SCNC: 4 MMOL/L (ref 3.4–5.1)
SODIUM SERPL-SCNC: 143 MMOL/L (ref 133–145)

## 2024-01-22 PROCEDURE — 80048 BASIC METABOLIC PNL TOTAL CA: CPT | Mod: OUT | Performed by: INTERNAL MEDICINE

## 2024-01-25 ENCOUNTER — NURSING HOME VISIT (OUTPATIENT)
Dept: POST ACUTE CARE | Facility: EXTERNAL LOCATION | Age: 79
End: 2024-01-25
Payer: COMMERCIAL

## 2024-01-25 VITALS
TEMPERATURE: 97.8 F | DIASTOLIC BLOOD PRESSURE: 92 MMHG | RESPIRATION RATE: 18 BRPM | BODY MASS INDEX: 29.09 KG/M2 | HEART RATE: 79 BPM | WEIGHT: 180.2 LBS | SYSTOLIC BLOOD PRESSURE: 151 MMHG | OXYGEN SATURATION: 98 %

## 2024-01-25 DIAGNOSIS — H10.31 ACUTE BACTERIAL CONJUNCTIVITIS OF RIGHT EYE: Primary | ICD-10-CM

## 2024-01-25 PROCEDURE — 99309 SBSQ NF CARE MODERATE MDM 30: CPT | Performed by: PHYSICIAN ASSISTANT

## 2024-01-25 ASSESSMENT — ENCOUNTER SYMPTOMS
HEMATURIA: 0
SHORTNESS OF BREATH: 0
NAUSEA: 0
DIARRHEA: 0
WEAKNESS: 0
WHEEZING: 0
EYE REDNESS: 1
CONSTIPATION: 0
COUGH: 0
FATIGUE: 0
DYSURIA: 0
TREMORS: 0
CONFUSION: 1
EYE DISCHARGE: 1
FREQUENCY: 0
VOMITING: 0
ABDOMINAL PAIN: 0
FEVER: 0
APPETITE CHANGE: 0
HEADACHES: 0
DIZZINESS: 0
CHILLS: 0
NERVOUS/ANXIOUS: 0

## 2024-01-25 NOTE — LETTER
Patient: Syed Meyers  : 1945    Encounter Date: 2024    No chief complaint on file.      Subjective  [id number] : Syed Meyers is a 78 y.o. male LTC resident at [facility].   HPI   Pt with h/o CVA, COPD, and dementia.   Notified by nursing that pt had redness and drainage from his right eye.   Pt seen while sitting up in his wheelchair.  He is alert and pleasant.   Lasix was stopped due to a mild KENTRELL = repeat labs are improved with the stopping of the diuretic.   He has some dependent edema in the lower legs and is compliant with ger hose.   He denies any shortness of breath or cough.   Seems to be at baseline.   He has had a weight loss.   Vitals are stable.   No abdominal pain.  No reports of any n/v/d/c.  Code status is DNRCC.  Review of Systems   Constitutional:  Negative for appetite change, chills, fatigue and fever.   Eyes:  Positive for discharge and redness.   Respiratory:  Negative for cough, shortness of breath and wheezing.    Cardiovascular:  Negative for chest pain and leg swelling.   Gastrointestinal:  Negative for abdominal pain, constipation, diarrhea, nausea and vomiting.   Genitourinary:  Negative for dysuria, frequency and hematuria.   Neurological:  Negative for dizziness, tremors, weakness and headaches.   Psychiatric/Behavioral:  Positive for confusion (due to dementia). The patient is not nervous/anxious.    All other systems reviewed and are negative.      Objective  BP (!) 151/92   Pulse 79   Temp 36.6 °C (97.8 °F)   Resp 18   Wt 81.7 kg (180 lb 3.2 oz)   SpO2 98%   BMI 29.09 kg/m²    Physical Exam  Constitutional:       General: He is not in acute distress.  Eyes:      General:         Right eye: Discharge (and erythema to the conjunctiva) present.      Pupils: Pupils are equal, round, and reactive to light.   Cardiovascular:      Rate and Rhythm: Normal rate and regular rhythm.      Heart sounds: No murmur heard.  Pulmonary:      Effort: Pulmonary effort is  normal. No respiratory distress.      Breath sounds: No wheezing, rhonchi or rales.   Abdominal:      General: Abdomen is flat. Bowel sounds are normal. There is no distension.      Palpations: Abdomen is soft. There is no mass.      Tenderness: There is no abdominal tenderness.   Musculoskeletal:         General: No swelling (.). Normal range of motion.   Skin:     General: Skin is warm and dry.      Findings: No erythema or rash.   Neurological:      General: No focal deficit present.      Mental Status: He is alert. Mental status is at baseline.      Comments: A&O x 1-2       Cerebrovascular accident (CVA) due to embolism of cerebral artery (CMS/[facility]) I63.40        With residual cognitive impairment.   Aspirin stopped due to GIB.            CKD (chronic kidney disease) stage 3, GFR 30-59 ml/min (CMS/[facility]) N18.30       Stable - monitoring monthly labs.            Dementia without behavioral disturbance, psychotic disturbance, mood disturbance, or anxiety (CMS/[facility]) F03.90       Continue Donepezil.           H/O deep venous thrombosis Z86.718       IVC filter has been removed.           HTN (hypertension), benign  I10       Continue losartan.  Monitor blood pressures and adjust meds as needed.           Aortic aneurysm (CMS/[facility]) I71.9       Follows with Vascular           Lower extremity edema R60.0       Improved.  NO edema noted.    Monitor for change in swelling.  Nursing does ace wrap legs daily.            COPD (chronic obstructive pulmonary disease) (CMS/[facility]) J44.9       Continue advair and albuterol prn.      KENTRELL:  improved off lasix.  Monitor weights.   Conjunctivitis:  right eye.  Primary   Treat with gentamicin ophthalmic sol 2 drops to right eye TID x 7 days.          Time spent: 30 min in review of chart, labs and orders, consultation with pt and documentation.       Electronically Signed By: Karol Gant PA-C   1/25/24  6:35 PM

## 2024-01-25 NOTE — PROGRESS NOTES
No chief complaint on file.      Subjective   [id number] : Syed Meyers is a 78 y.o. male LTC resident at [facility].   HPI   Pt with h/o CVA, COPD, and dementia.   Notified by nursing that pt had redness and drainage from his right eye.   Pt seen while sitting up in his wheelchair.  He is alert and pleasant.   Lasix was stopped due to a mild KENTRELL = repeat labs are improved with the stopping of the diuretic.   He has some dependent edema in the lower legs and is compliant with ger hose.   He denies any shortness of breath or cough.   Seems to be at baseline.   He has had a weight loss.   Vitals are stable.   No abdominal pain.  No reports of any n/v/d/c.  Code status is DNRCC.  Review of Systems   Constitutional:  Negative for appetite change, chills, fatigue and fever.   Eyes:  Positive for discharge and redness.   Respiratory:  Negative for cough, shortness of breath and wheezing.    Cardiovascular:  Negative for chest pain and leg swelling.   Gastrointestinal:  Negative for abdominal pain, constipation, diarrhea, nausea and vomiting.   Genitourinary:  Negative for dysuria, frequency and hematuria.   Neurological:  Negative for dizziness, tremors, weakness and headaches.   Psychiatric/Behavioral:  Positive for confusion (due to dementia). The patient is not nervous/anxious.    All other systems reviewed and are negative.      Objective   BP (!) 151/92   Pulse 79   Temp 36.6 °C (97.8 °F)   Resp 18   Wt 81.7 kg (180 lb 3.2 oz)   SpO2 98%   BMI 29.09 kg/m²    Physical Exam  Constitutional:       General: He is not in acute distress.  Eyes:      General:         Right eye: Discharge (and erythema to the conjunctiva) present.      Pupils: Pupils are equal, round, and reactive to light.   Cardiovascular:      Rate and Rhythm: Normal rate and regular rhythm.      Heart sounds: No murmur heard.  Pulmonary:      Effort: Pulmonary effort is normal. No respiratory distress.      Breath sounds: No wheezing, rhonchi  or rales.   Abdominal:      General: Abdomen is flat. Bowel sounds are normal. There is no distension.      Palpations: Abdomen is soft. There is no mass.      Tenderness: There is no abdominal tenderness.   Musculoskeletal:         General: No swelling (.). Normal range of motion.   Skin:     General: Skin is warm and dry.      Findings: No erythema or rash.   Neurological:      General: No focal deficit present.      Mental Status: He is alert. Mental status is at baseline.      Comments: A&O x 1-2       Cerebrovascular accident (CVA) due to embolism of cerebral artery (CMS/[facility]) I63.40        With residual cognitive impairment.   Aspirin stopped due to GIB.            CKD (chronic kidney disease) stage 3, GFR 30-59 ml/min (CMS/[facility]) N18.30       Stable - monitoring monthly labs.            Dementia without behavioral disturbance, psychotic disturbance, mood disturbance, or anxiety (CMS/[facility]) F03.90       Continue Donepezil.           H/O deep venous thrombosis Z86.718       IVC filter has been removed.           HTN (hypertension), benign  I10       Continue losartan.  Monitor blood pressures and adjust meds as needed.           Aortic aneurysm (CMS/[facility]) I71.9       Follows with Vascular           Lower extremity edema R60.0       Improved.  NO edema noted.    Monitor for change in swelling.  Nursing does ace wrap legs daily.            COPD (chronic obstructive pulmonary disease) (CMS/[facility]) J44.9       Continue advair and albuterol prn.      KENTRELL:  improved off lasix.  Monitor weights.   Conjunctivitis:  right eye.  Primary   Treat with gentamicin ophthalmic sol 2 drops to right eye TID x 7 days.          Time spent: 30 min in review of chart, labs and orders, consultation with pt and documentation.

## 2024-02-14 ENCOUNTER — LAB REQUISITION (OUTPATIENT)
Dept: LAB | Facility: HOSPITAL | Age: 79
End: 2024-02-14
Payer: COMMERCIAL

## 2024-02-14 DIAGNOSIS — F03.90 UNSPECIFIED DEMENTIA, UNSPECIFIED SEVERITY, WITHOUT BEHAVIORAL DISTURBANCE, PSYCHOTIC DISTURBANCE, MOOD DISTURBANCE, AND ANXIETY (MULTI): ICD-10-CM

## 2024-02-14 DIAGNOSIS — K92.2 GASTROINTESTINAL HEMORRHAGE, UNSPECIFIED: ICD-10-CM

## 2024-02-14 LAB
ALBUMIN SERPL-MCNC: 3.3 G/DL (ref 3.5–5)
ALP BLD-CCNC: 66 U/L (ref 35–125)
ALT SERPL-CCNC: 9 U/L (ref 5–40)
ANION GAP SERPL CALC-SCNC: 8 MMOL/L
AST SERPL-CCNC: 14 U/L (ref 5–40)
BILIRUB SERPL-MCNC: 0.5 MG/DL (ref 0.1–1.2)
BUN SERPL-MCNC: 25 MG/DL (ref 8–25)
C DIF TOX TCDA+TCDB STL QL NAA+PROBE: NOT DETECTED
CALCIUM SERPL-MCNC: 8.3 MG/DL (ref 8.5–10.4)
CHLORIDE SERPL-SCNC: 113 MMOL/L (ref 97–107)
CO2 SERPL-SCNC: 24 MMOL/L (ref 24–31)
CREAT SERPL-MCNC: 1.5 MG/DL (ref 0.4–1.6)
EGFRCR SERPLBLD CKD-EPI 2021: 47 ML/MIN/1.73M*2
ERYTHROCYTE [DISTWIDTH] IN BLOOD BY AUTOMATED COUNT: 15.1 % (ref 11.5–14.5)
GLUCOSE SERPL-MCNC: 129 MG/DL (ref 65–99)
HCT VFR BLD AUTO: 38.6 % (ref 41–52)
HGB BLD-MCNC: 12 G/DL (ref 13.5–17.5)
MCH RBC QN AUTO: 26.9 PG (ref 26–34)
MCHC RBC AUTO-ENTMCNC: 31.1 G/DL (ref 32–36)
MCV RBC AUTO: 87 FL (ref 80–100)
NRBC BLD-RTO: 0 /100 WBCS (ref 0–0)
PLATELET # BLD AUTO: 187 X10*3/UL (ref 150–450)
POTASSIUM SERPL-SCNC: 4.7 MMOL/L (ref 3.4–5.1)
PROT SERPL-MCNC: 5.4 G/DL (ref 5.9–7.9)
RBC # BLD AUTO: 4.46 X10*6/UL (ref 4.5–5.9)
SODIUM SERPL-SCNC: 145 MMOL/L (ref 133–145)
WBC # BLD AUTO: 8.1 X10*3/UL (ref 4.4–11.3)

## 2024-02-14 PROCEDURE — 87493 C DIFF AMPLIFIED PROBE: CPT | Mod: OUT | Performed by: INTERNAL MEDICINE

## 2024-02-14 PROCEDURE — 85027 COMPLETE CBC AUTOMATED: CPT | Mod: OUT | Performed by: INTERNAL MEDICINE

## 2024-02-14 PROCEDURE — 80053 COMPREHEN METABOLIC PANEL: CPT | Mod: OUT | Performed by: INTERNAL MEDICINE

## 2024-02-17 ENCOUNTER — LAB REQUISITION (OUTPATIENT)
Dept: LAB | Facility: HOSPITAL | Age: 79
End: 2024-02-17
Payer: COMMERCIAL

## 2024-02-17 DIAGNOSIS — F03.90 UNSPECIFIED DEMENTIA, UNSPECIFIED SEVERITY, WITHOUT BEHAVIORAL DISTURBANCE, PSYCHOTIC DISTURBANCE, MOOD DISTURBANCE, AND ANXIETY (MULTI): ICD-10-CM

## 2024-02-17 LAB
ANION GAP SERPL CALC-SCNC: 13 MMOL/L
BUN SERPL-MCNC: 23 MG/DL (ref 8–25)
CALCIUM SERPL-MCNC: 8.3 MG/DL (ref 8.5–10.4)
CHLORIDE SERPL-SCNC: 109 MMOL/L (ref 97–107)
CO2 SERPL-SCNC: 18 MMOL/L (ref 24–31)
CREAT SERPL-MCNC: 1.5 MG/DL (ref 0.4–1.6)
EGFRCR SERPLBLD CKD-EPI 2021: 47 ML/MIN/1.73M*2
ERYTHROCYTE [DISTWIDTH] IN BLOOD BY AUTOMATED COUNT: 15.1 % (ref 11.5–14.5)
GLUCOSE SERPL-MCNC: 80 MG/DL (ref 65–99)
HCT VFR BLD AUTO: 38.8 % (ref 41–52)
HGB BLD-MCNC: 12.1 G/DL (ref 13.5–17.5)
MCH RBC QN AUTO: 27.3 PG (ref 26–34)
MCHC RBC AUTO-ENTMCNC: 31.2 G/DL (ref 32–36)
MCV RBC AUTO: 88 FL (ref 80–100)
NRBC BLD-RTO: 0 /100 WBCS (ref 0–0)
PLATELET # BLD AUTO: 189 X10*3/UL (ref 150–450)
POTASSIUM SERPL-SCNC: 4 MMOL/L (ref 3.4–5.1)
RBC # BLD AUTO: 4.43 X10*6/UL (ref 4.5–5.9)
SODIUM SERPL-SCNC: 140 MMOL/L (ref 133–145)
WBC # BLD AUTO: 3.5 X10*3/UL (ref 4.4–11.3)

## 2024-02-17 PROCEDURE — 85027 COMPLETE CBC AUTOMATED: CPT | Mod: OUT | Performed by: INTERNAL MEDICINE

## 2024-02-17 PROCEDURE — 80048 BASIC METABOLIC PNL TOTAL CA: CPT | Mod: OUT | Performed by: INTERNAL MEDICINE

## 2024-03-01 ENCOUNTER — NURSING HOME VISIT (OUTPATIENT)
Dept: POST ACUTE CARE | Facility: EXTERNAL LOCATION | Age: 79
End: 2024-03-01
Payer: COMMERCIAL

## 2024-03-01 VITALS
BODY MASS INDEX: 29.73 KG/M2 | DIASTOLIC BLOOD PRESSURE: 78 MMHG | RESPIRATION RATE: 20 BRPM | HEART RATE: 70 BPM | OXYGEN SATURATION: 100 % | SYSTOLIC BLOOD PRESSURE: 140 MMHG | WEIGHT: 184.2 LBS | TEMPERATURE: 98.6 F

## 2024-03-01 DIAGNOSIS — I63.40 CEREBROVASCULAR ACCIDENT (CVA) DUE TO EMBOLISM OF CEREBRAL ARTERY (MULTI): Primary | ICD-10-CM

## 2024-03-01 DIAGNOSIS — J44.1 COPD EXACERBATION (MULTI): ICD-10-CM

## 2024-03-01 DIAGNOSIS — F01.B0 MODERATE VASCULAR DEMENTIA WITHOUT BEHAVIORAL DISTURBANCE, PSYCHOTIC DISTURBANCE, MOOD DISTURBANCE, OR ANXIETY (MULTI): ICD-10-CM

## 2024-03-01 DIAGNOSIS — R60.0 LOWER EXTREMITY EDEMA: ICD-10-CM

## 2024-03-01 DIAGNOSIS — I10 HTN (HYPERTENSION), BENIGN: ICD-10-CM

## 2024-03-01 PROCEDURE — 99308 SBSQ NF CARE LOW MDM 20: CPT | Performed by: PHYSICIAN ASSISTANT

## 2024-03-01 ASSESSMENT — ENCOUNTER SYMPTOMS
CONSTIPATION: 0
NERVOUS/ANXIOUS: 0
TREMORS: 0
EYE REDNESS: 0
HEADACHES: 0
WHEEZING: 0
DYSURIA: 0
FATIGUE: 0
VOMITING: 0
DIZZINESS: 0
DIARRHEA: 0
APPETITE CHANGE: 0
SHORTNESS OF BREATH: 0
COUGH: 0
CHILLS: 0
HEMATURIA: 0
ABDOMINAL PAIN: 0
FREQUENCY: 0
NAUSEA: 0
FEVER: 0
EYE DISCHARGE: 0
CONFUSION: 1
WEAKNESS: 0

## 2024-03-01 NOTE — LETTER
Patient: Syed Meyers  : 1945    Encounter Date: 2024    No chief complaint on file.      Subjective  [id number] : Syed Meyers is a 78 y.o. male LTC resident at [facility].   HPI   Pt with h/o CVA, COPD, and dementia.   Pt seen while sitting in wheelchair.  He is alert, pleasant and interactive.   No new issues reported by nursing.   He appears to be at baseline cognitively. He does have bilateral lower leg edema.  Nursing applies ace wraps to lower legs daily.   His weight has been stable.  No shortness of breath or cough reported.  He is eating and drinking well.   Vitals are stable.   No abdominal pain.  No reports of any n/v/d/c.  Code status is DNRCC.  Review of Systems   Constitutional:  Negative for appetite change, chills, fatigue and fever.   Eyes:  Negative for discharge and redness.   Respiratory:  Negative for cough, shortness of breath and wheezing.    Cardiovascular:  Negative for chest pain and leg swelling.   Gastrointestinal:  Negative for abdominal pain, constipation, diarrhea, nausea and vomiting.   Genitourinary:  Negative for dysuria, frequency and hematuria.   Neurological:  Negative for dizziness, tremors, weakness and headaches.   Psychiatric/Behavioral:  Positive for confusion (due to dementia). The patient is not nervous/anxious.    All other systems reviewed and are negative.      Objective  /78   Pulse 70   Temp 37 °C (98.6 °F)   Resp 20   Wt 83.6 kg (184 lb 3.2 oz)   SpO2 100%   BMI 29.73 kg/m²    Physical Exam  Constitutional:       General: He is not in acute distress.  Eyes:      Pupils: Pupils are equal, round, and reactive to light.   Cardiovascular:      Rate and Rhythm: Normal rate and regular rhythm.      Heart sounds: No murmur heard.  Pulmonary:      Effort: Pulmonary effort is normal. No respiratory distress.      Breath sounds: No wheezing, rhonchi or rales.   Abdominal:      General: Abdomen is flat. Bowel sounds are normal. There is no  distension.      Palpations: Abdomen is soft. There is no mass.      Tenderness: There is no abdominal tenderness.   Musculoskeletal:         General: No swelling (.). Normal range of motion.   Skin:     General: Skin is warm and dry.      Findings: No erythema or rash.   Neurological:      General: No focal deficit present.      Mental Status: He is alert. Mental status is at baseline.      Comments: A&O x 1-2       Cerebrovascular accident (CVA) due to embolism of cerebral artery (CMS/[facility]) I63.40        With residual cognitive impairment.   Aspirin stopped due to GIB.            CKD (chronic kidney disease) stage 3, GFR 30-59 ml/min (CMS/[facility]) N18.30       Stable - monitoring monthly labs.            Dementia without behavioral disturbance, psychotic disturbance, mood disturbance, or anxiety (CMS/[facility]) F03.90       Continue Donepezil.           H/O deep venous thrombosis Z86.718       IVC filter has been removed.           HTN (hypertension), benign  I10       Continue losartan.  Monitor blood pressures and adjust meds as needed.           Aortic aneurysm (CMS/[facility]) I71.9       Follows with Vascular           Lower extremity edema R60.0       Off lasix.  Nursing does ace wrap legs daily.            COPD (chronic obstructive pulmonary disease) (CMS/[facility]) J44.9       Continue advair and albuterol prn.      KENTRELL:  improved off lasix.  Monitor weights.         Time spent: 15 min in review of chart, labs and orders, consultation with pt and documentation.       Electronically Signed By: Karol Gant PA-C   3/1/24 12:41 PM

## 2024-03-01 NOTE — PROGRESS NOTES
No chief complaint on file.      Subjective   [id number] : Syed Meyers is a 78 y.o. male LTC resident at [facility].   HPI   Pt with h/o CVA, COPD, and dementia.   Pt seen while sitting in wheelchair.  He is alert, pleasant and interactive.   No new issues reported by nursing.   He appears to be at baseline cognitively. He does have bilateral lower leg edema.  Nursing applies ace wraps to lower legs daily.   His weight has been stable.  No shortness of breath or cough reported.  He is eating and drinking well.   Vitals are stable.   No abdominal pain.  No reports of any n/v/d/c.  Code status is DNC.  Review of Systems   Constitutional:  Negative for appetite change, chills, fatigue and fever.   Eyes:  Negative for discharge and redness.   Respiratory:  Negative for cough, shortness of breath and wheezing.    Cardiovascular:  Negative for chest pain and leg swelling.   Gastrointestinal:  Negative for abdominal pain, constipation, diarrhea, nausea and vomiting.   Genitourinary:  Negative for dysuria, frequency and hematuria.   Neurological:  Negative for dizziness, tremors, weakness and headaches.   Psychiatric/Behavioral:  Positive for confusion (due to dementia). The patient is not nervous/anxious.    All other systems reviewed and are negative.      Objective   /78   Pulse 70   Temp 37 °C (98.6 °F)   Resp 20   Wt 83.6 kg (184 lb 3.2 oz)   SpO2 100%   BMI 29.73 kg/m²    Physical Exam  Constitutional:       General: He is not in acute distress.  Eyes:      Pupils: Pupils are equal, round, and reactive to light.   Cardiovascular:      Rate and Rhythm: Normal rate and regular rhythm.      Heart sounds: No murmur heard.  Pulmonary:      Effort: Pulmonary effort is normal. No respiratory distress.      Breath sounds: No wheezing, rhonchi or rales.   Abdominal:      General: Abdomen is flat. Bowel sounds are normal. There is no distension.      Palpations: Abdomen is soft. There is no mass.       Tenderness: There is no abdominal tenderness.   Musculoskeletal:         General: No swelling (.). Normal range of motion.   Skin:     General: Skin is warm and dry.      Findings: No erythema or rash.   Neurological:      General: No focal deficit present.      Mental Status: He is alert. Mental status is at baseline.      Comments: A&O x 1-2       Cerebrovascular accident (CVA) due to embolism of cerebral artery (CMS/[facility]) I63.40        With residual cognitive impairment.   Aspirin stopped due to GIB.            CKD (chronic kidney disease) stage 3, GFR 30-59 ml/min (CMS/[facility]) N18.30       Stable - monitoring monthly labs.            Dementia without behavioral disturbance, psychotic disturbance, mood disturbance, or anxiety (CMS/[facility]) F03.90       Continue Donepezil.           H/O deep venous thrombosis Z86.718       IVC filter has been removed.           HTN (hypertension), benign  I10       Continue losartan.  Monitor blood pressures and adjust meds as needed.           Aortic aneurysm (CMS/[facility]) I71.9       Follows with Vascular           Lower extremity edema R60.0       Off lasix.  Nursing does ace wrap legs daily.            COPD (chronic obstructive pulmonary disease) (CMS/[facility]) J44.9       Continue advair and albuterol prn.      KENTRELL:  improved off lasix.  Monitor weights.         Time spent: 15 min in review of chart, labs and orders, consultation with pt and documentation.

## 2024-03-08 ENCOUNTER — LAB REQUISITION (OUTPATIENT)
Dept: LAB | Facility: HOSPITAL | Age: 79
End: 2024-03-08
Payer: COMMERCIAL

## 2024-03-08 ENCOUNTER — NURSING HOME VISIT (OUTPATIENT)
Dept: POST ACUTE CARE | Facility: EXTERNAL LOCATION | Age: 79
End: 2024-03-08
Payer: COMMERCIAL

## 2024-03-08 VITALS
OXYGEN SATURATION: 100 % | SYSTOLIC BLOOD PRESSURE: 169 MMHG | HEART RATE: 70 BPM | BODY MASS INDEX: 28.7 KG/M2 | RESPIRATION RATE: 18 BRPM | WEIGHT: 177.8 LBS | DIASTOLIC BLOOD PRESSURE: 96 MMHG | TEMPERATURE: 98.8 F

## 2024-03-08 DIAGNOSIS — N18.30 STAGE 3 CHRONIC KIDNEY DISEASE, UNSPECIFIED WHETHER STAGE 3A OR 3B CKD (MULTI): ICD-10-CM

## 2024-03-08 DIAGNOSIS — F03.90 UNSPECIFIED DEMENTIA, UNSPECIFIED SEVERITY, WITHOUT BEHAVIORAL DISTURBANCE, PSYCHOTIC DISTURBANCE, MOOD DISTURBANCE, AND ANXIETY (MULTI): ICD-10-CM

## 2024-03-08 DIAGNOSIS — I10 HTN (HYPERTENSION), BENIGN: Primary | ICD-10-CM

## 2024-03-08 DIAGNOSIS — R60.0 LOWER EXTREMITY EDEMA: ICD-10-CM

## 2024-03-08 DIAGNOSIS — I10 ESSENTIAL (PRIMARY) HYPERTENSION: ICD-10-CM

## 2024-03-08 DIAGNOSIS — I71.9 AORTIC ANEURYSM WITHOUT RUPTURE, UNSPECIFIED PORTION OF AORTA (CMS-HCC): ICD-10-CM

## 2024-03-08 DIAGNOSIS — J44.1 COPD EXACERBATION (MULTI): ICD-10-CM

## 2024-03-08 DIAGNOSIS — I63.40 CEREBROVASCULAR ACCIDENT (CVA) DUE TO EMBOLISM OF CEREBRAL ARTERY (MULTI): ICD-10-CM

## 2024-03-08 DIAGNOSIS — F01.B0 MODERATE VASCULAR DEMENTIA WITHOUT BEHAVIORAL DISTURBANCE, PSYCHOTIC DISTURBANCE, MOOD DISTURBANCE, OR ANXIETY (MULTI): ICD-10-CM

## 2024-03-08 LAB
ANION GAP SERPL CALC-SCNC: 11 MMOL/L
BUN SERPL-MCNC: 18 MG/DL (ref 8–25)
CALCIUM SERPL-MCNC: 8.7 MG/DL (ref 8.5–10.4)
CHLORIDE SERPL-SCNC: 108 MMOL/L (ref 97–107)
CO2 SERPL-SCNC: 24 MMOL/L (ref 24–31)
CREAT SERPL-MCNC: 1.6 MG/DL (ref 0.4–1.6)
EGFRCR SERPLBLD CKD-EPI 2021: 44 ML/MIN/1.73M*2
ERYTHROCYTE [DISTWIDTH] IN BLOOD BY AUTOMATED COUNT: 15 % (ref 11.5–14.5)
GLUCOSE SERPL-MCNC: 97 MG/DL (ref 65–99)
HCT VFR BLD AUTO: 39.9 % (ref 41–52)
HGB BLD-MCNC: 12.3 G/DL (ref 13.5–17.5)
MCH RBC QN AUTO: 26.9 PG (ref 26–34)
MCHC RBC AUTO-ENTMCNC: 30.8 G/DL (ref 32–36)
MCV RBC AUTO: 87 FL (ref 80–100)
NRBC BLD-RTO: 0 /100 WBCS (ref 0–0)
PLATELET # BLD AUTO: 156 X10*3/UL (ref 150–450)
POTASSIUM SERPL-SCNC: 4.1 MMOL/L (ref 3.4–5.1)
RBC # BLD AUTO: 4.57 X10*6/UL (ref 4.5–5.9)
SODIUM SERPL-SCNC: 143 MMOL/L (ref 133–145)
WBC # BLD AUTO: 3.6 X10*3/UL (ref 4.4–11.3)

## 2024-03-08 PROCEDURE — 80048 BASIC METABOLIC PNL TOTAL CA: CPT | Mod: OUT | Performed by: INTERNAL MEDICINE

## 2024-03-08 PROCEDURE — 85027 COMPLETE CBC AUTOMATED: CPT | Mod: OUT | Performed by: INTERNAL MEDICINE

## 2024-03-08 PROCEDURE — 99309 SBSQ NF CARE MODERATE MDM 30: CPT | Performed by: PHYSICIAN ASSISTANT

## 2024-03-08 ASSESSMENT — ENCOUNTER SYMPTOMS
COUGH: 0
TREMORS: 0
ABDOMINAL PAIN: 0
WHEEZING: 0
DIARRHEA: 0
DIZZINESS: 0
CHILLS: 0
NERVOUS/ANXIOUS: 0
EYE REDNESS: 0
APPETITE CHANGE: 0
DYSURIA: 0
SHORTNESS OF BREATH: 0
FREQUENCY: 0
CONSTIPATION: 0
FEVER: 0
HEADACHES: 0
HEMATURIA: 0
NAUSEA: 0
WEAKNESS: 0
VOMITING: 0
CONFUSION: 1
EYE DISCHARGE: 0
FATIGUE: 0

## 2024-03-08 NOTE — LETTER
Patient: Syed Meyers  : 1945    Encounter Date: 2024    No chief complaint on file.      Subjective  [id number] : Syed Meyers is a 78 y.o. male LTC resident at [facility].   HPI   Pt with h/o CVA, COPD, and dementia.   Pt seen while sitting in wheelchair.  He is alert, pleasant and interactive.   Pt had high blood pressures over night.  On call physician called and hydralazine was given.   Reviewed pt blood pressures and they have been running high over the last week.   He is on losartan and metoprolol.  He offers no complaints but is a poor historian due to dementia.  Nursing has noted no new issues with pt.   He appears to be at baseline cognitively. He does have bilateral lower leg edema.  Pt had weight this week and it shows a weight loss of about 6# over the last month.   No shortness of breath or cough reported.  He is eating and drinking well.   Vitals are stable.   No abdominal pain.  No reports of any n/v/d/c.  Code status is DNRCC.  Review of Systems   Constitutional:  Negative for appetite change, chills, fatigue and fever.   Eyes:  Negative for discharge and redness.   Respiratory:  Negative for cough, shortness of breath and wheezing.    Cardiovascular:  Negative for chest pain and leg swelling.   Gastrointestinal:  Negative for abdominal pain, constipation, diarrhea, nausea and vomiting.   Genitourinary:  Negative for dysuria, frequency and hematuria.   Neurological:  Negative for dizziness, tremors, weakness and headaches.   Psychiatric/Behavioral:  Positive for confusion (due to dementia). The patient is not nervous/anxious.    All other systems reviewed and are negative.      Objective  BP (!) 169/96   Pulse 70   Temp 37.1 °C (98.8 °F)   Resp 18   Wt 80.6 kg (177 lb 12.8 oz)   SpO2 100%   BMI 28.70 kg/m²    Physical Exam  Constitutional:       General: He is not in acute distress.  Eyes:      Pupils: Pupils are equal, round, and reactive to light.   Cardiovascular:       Rate and Rhythm: Normal rate and regular rhythm.      Heart sounds: No murmur heard.  Pulmonary:      Effort: Pulmonary effort is normal. No respiratory distress.      Breath sounds: No wheezing, rhonchi or rales.   Abdominal:      General: Abdomen is flat. Bowel sounds are normal. There is no distension.      Palpations: Abdomen is soft. There is no mass.      Tenderness: There is no abdominal tenderness.   Musculoskeletal:         General: Swelling (.) present. Normal range of motion.   Skin:     General: Skin is warm and dry.      Findings: No erythema or rash.   Neurological:      General: No focal deficit present.      Mental Status: He is alert. Mental status is at baseline.      Comments: A&O x 1-2     Cerebrovascular accident (CVA) due to embolism of cerebral artery (CMS/[facility]) I63.40        With residual cognitive impairment.   Aspirin stopped due to GIB.            CKD (chronic kidney disease) stage 3, GFR 30-59 ml/min (CMS/[facility]) N18.30       Stable - monitoring monthly labs.            Dementia without behavioral disturbance, psychotic disturbance, mood disturbance, or anxiety (CMS/[facility]) F03.90       Continue Donepezil.           H/O deep venous thrombosis Z86.718       IVC filter has been removed.           HTN (hypertension), benign  I10       Blood pressures have been running high.  Labs and chest x-ray reviewed.   Increase  losartan to 50mg daily.   Add hydralazine 25mg q8 hours prn for SBP > 160 and DBP > 100.   Monitor blood pressures and adjust meds as needed.           Aortic aneurysm (CMS/[facility]) I71.9       Follows with Vascular           Lower extremity edema R60.0       Off lasix.  Nursing does ace wrap legs daily.            COPD (chronic obstructive pulmonary disease) (CMS/[facility]) J44.9       Continue advair and albuterol prn.      KENTRELL:  improved off lasix.  Monitor weights.         Time spent: 15 min in review of chart, labs and orders, consultation with pt and documentation.       Electronically  Signed By: Karol Gant PA-C   3/8/24  3:09 PM

## 2024-03-08 NOTE — PROGRESS NOTES
No chief complaint on file.      Subjective   [id number] : Syed Meyers is a 78 y.o. male LTC resident at [facility].   HPI   Pt with h/o CVA, COPD, and dementia.   Pt seen while sitting in wheelchair.  He is alert, pleasant and interactive.   Pt had high blood pressures over night.  On call physician called and hydralazine was given.   Reviewed pt blood pressures and they have been running high over the last week.   He is on losartan and metoprolol.  He offers no complaints but is a poor historian due to dementia.  Nursing has noted no new issues with pt.   He appears to be at baseline cognitively. He does have bilateral lower leg edema.  Pt had weight this week and it shows a weight loss of about 6# over the last month.   No shortness of breath or cough reported.  He is eating and drinking well.   Vitals are stable.   No abdominal pain.  No reports of any n/v/d/c.  Code status is DNC.  Review of Systems   Constitutional:  Negative for appetite change, chills, fatigue and fever.   Eyes:  Negative for discharge and redness.   Respiratory:  Negative for cough, shortness of breath and wheezing.    Cardiovascular:  Negative for chest pain and leg swelling.   Gastrointestinal:  Negative for abdominal pain, constipation, diarrhea, nausea and vomiting.   Genitourinary:  Negative for dysuria, frequency and hematuria.   Neurological:  Negative for dizziness, tremors, weakness and headaches.   Psychiatric/Behavioral:  Positive for confusion (due to dementia). The patient is not nervous/anxious.    All other systems reviewed and are negative.      Objective   BP (!) 169/96   Pulse 70   Temp 37.1 °C (98.8 °F)   Resp 18   Wt 80.6 kg (177 lb 12.8 oz)   SpO2 100%   BMI 28.70 kg/m²    Physical Exam  Constitutional:       General: He is not in acute distress.  Eyes:      Pupils: Pupils are equal, round, and reactive to light.   Cardiovascular:      Rate and Rhythm: Normal rate and regular rhythm.      Heart sounds: No  murmur heard.  Pulmonary:      Effort: Pulmonary effort is normal. No respiratory distress.      Breath sounds: No wheezing, rhonchi or rales.   Abdominal:      General: Abdomen is flat. Bowel sounds are normal. There is no distension.      Palpations: Abdomen is soft. There is no mass.      Tenderness: There is no abdominal tenderness.   Musculoskeletal:         General: Swelling (.) present. Normal range of motion.   Skin:     General: Skin is warm and dry.      Findings: No erythema or rash.   Neurological:      General: No focal deficit present.      Mental Status: He is alert. Mental status is at baseline.      Comments: A&O x 1-2     Cerebrovascular accident (CVA) due to embolism of cerebral artery (CMS/[facility]) I63.40        With residual cognitive impairment.   Aspirin stopped due to GIB.            CKD (chronic kidney disease) stage 3, GFR 30-59 ml/min (CMS/[facility]) N18.30       Stable - monitoring monthly labs.            Dementia without behavioral disturbance, psychotic disturbance, mood disturbance, or anxiety (CMS/[facility]) F03.90       Continue Donepezil.           H/O deep venous thrombosis Z86.718       IVC filter has been removed.           HTN (hypertension), benign  I10       Blood pressures have been running high.  Labs and chest x-ray reviewed.   Increase  losartan to 50mg daily.   Add hydralazine 25mg q8 hours prn for SBP > 160 and DBP > 100.   Monitor blood pressures and adjust meds as needed.           Aortic aneurysm (CMS/[facility]) I71.9       Follows with Vascular           Lower extremity edema R60.0       Off lasix.  Nursing does ace wrap legs daily.            COPD (chronic obstructive pulmonary disease) (CMS/[facility]) J44.9       Continue advair and albuterol prn.      KENTRELL:  improved off lasix.  Monitor weights.         Time spent: 15 min in review of chart, labs and orders, consultation with pt and documentation.

## 2024-03-11 ENCOUNTER — NURSING HOME VISIT (OUTPATIENT)
Dept: POST ACUTE CARE | Facility: EXTERNAL LOCATION | Age: 79
End: 2024-03-11
Payer: COMMERCIAL

## 2024-03-11 VITALS
DIASTOLIC BLOOD PRESSURE: 121 MMHG | RESPIRATION RATE: 20 BRPM | HEART RATE: 71 BPM | TEMPERATURE: 98.6 F | BODY MASS INDEX: 28.7 KG/M2 | OXYGEN SATURATION: 98 % | SYSTOLIC BLOOD PRESSURE: 168 MMHG | WEIGHT: 177.8 LBS

## 2024-03-11 DIAGNOSIS — I10 HTN (HYPERTENSION), BENIGN: Primary | ICD-10-CM

## 2024-03-11 DIAGNOSIS — N18.30 STAGE 3 CHRONIC KIDNEY DISEASE, UNSPECIFIED WHETHER STAGE 3A OR 3B CKD (MULTI): ICD-10-CM

## 2024-03-11 DIAGNOSIS — I63.40 CEREBROVASCULAR ACCIDENT (CVA) DUE TO EMBOLISM OF CEREBRAL ARTERY (MULTI): ICD-10-CM

## 2024-03-11 DIAGNOSIS — F01.B0 MODERATE VASCULAR DEMENTIA WITHOUT BEHAVIORAL DISTURBANCE, PSYCHOTIC DISTURBANCE, MOOD DISTURBANCE, OR ANXIETY (MULTI): ICD-10-CM

## 2024-03-11 DIAGNOSIS — R60.0 LOWER EXTREMITY EDEMA: ICD-10-CM

## 2024-03-11 PROCEDURE — 99308 SBSQ NF CARE LOW MDM 20: CPT | Performed by: PHYSICIAN ASSISTANT

## 2024-03-11 ASSESSMENT — ENCOUNTER SYMPTOMS
NAUSEA: 0
CONFUSION: 1
FATIGUE: 0
EYE DISCHARGE: 0
SHORTNESS OF BREATH: 0
ABDOMINAL PAIN: 0
HEMATURIA: 0
VOMITING: 0
APPETITE CHANGE: 0
HEADACHES: 0
WHEEZING: 0
TREMORS: 0
CONSTIPATION: 0
EYE REDNESS: 0
CHILLS: 0
WEAKNESS: 0
DIZZINESS: 0
DIARRHEA: 0
NERVOUS/ANXIOUS: 0
DYSURIA: 0
FREQUENCY: 0
FEVER: 0
COUGH: 0

## 2024-03-11 NOTE — PROGRESS NOTES
No chief complaint on file.      Subjective   [id number] : Syed Meyers is a 78 y.o. male LTC resident at [facility].   HPI   Pt with h/o CVA, COPD, and dementia.   Pt seen while sitting in wheelchair.  He is alert, pleasant and interactive.   Pt seen in follow up for  HTN.   Blood pressures continue to be high at times.   Seems to be worse at night.   Hydralazine does seem to be effective for lowing his blood pressures.   He denies any headaches, chest pains or dizziness.  He does have dementia and is a poor historian.  No issues or complaints reported by nursing.    Has been noted to be sleeping more than usual.   He has had a weight loss over the last month and dietician reported that po intake is down.   Pt had labs which were stable and chest x-ray which was negative last Friday.   He continues to have bilateral lower leg edema.   No shortness of breath or cough reported.   No abdominal pain.  No reports of any n/v/d/c.  Code status is DNRCC.  Review of Systems   Constitutional:  Negative for appetite change, chills, fatigue and fever.   Eyes:  Negative for discharge and redness.   Respiratory:  Negative for cough, shortness of breath and wheezing.    Cardiovascular:  Negative for chest pain and leg swelling.   Gastrointestinal:  Negative for abdominal pain, constipation, diarrhea, nausea and vomiting.   Genitourinary:  Negative for dysuria, frequency and hematuria.   Neurological:  Negative for dizziness, tremors, weakness and headaches.   Psychiatric/Behavioral:  Positive for confusion (due to dementia). The patient is not nervous/anxious.    All other systems reviewed and are negative.      Objective   BP (!) 168/121   Pulse 71   Temp 37 °C (98.6 °F)   Resp 20   Wt 80.6 kg (177 lb 12.8 oz)   SpO2 98%   BMI 28.70 kg/m²    Physical Exam  Constitutional:       General: He is not in acute distress.  Eyes:      Pupils: Pupils are equal, round, and reactive to light.   Cardiovascular:      Rate and  Rhythm: Normal rate and regular rhythm.      Heart sounds: No murmur heard.  Pulmonary:      Effort: Pulmonary effort is normal. No respiratory distress.      Breath sounds: No wheezing, rhonchi or rales.   Abdominal:      General: Abdomen is flat. Bowel sounds are normal. There is no distension.      Palpations: Abdomen is soft. There is no mass.      Tenderness: There is no abdominal tenderness.   Musculoskeletal:         General: Swelling (.) present. Normal range of motion.   Skin:     General: Skin is warm and dry.      Findings: No erythema or rash.   Neurological:      General: No focal deficit present.      Mental Status: He is alert. Mental status is at baseline.      Comments: A&O x 1-2       Cerebrovascular accident (CVA) due to embolism of cerebral artery (CMS/[facility]) I63.40        With residual cognitive impairment.   Aspirin stopped due to GIB.            CKD (chronic kidney disease) stage 3, GFR 30-59 ml/min (CMS/[facility]) N18.30       Stable - monitoring monthly labs.            Dementia without behavioral disturbance, psychotic disturbance, mood disturbance, or anxiety (CMS/[facility]) F03.90       Continue Donepezil.           H/O deep venous thrombosis Z86.718       IVC filter has been removed.           HTN (hypertension), benign  I10       Blood pressures still running high.   Add norvasc 5mg in the evening.  Continue  losartan to 50mg daily.   Add hydralazine 25mg q8 hours prn for SBP > 160 and DBP > 100.   Monitor blood pressures and adjust meds as needed.           Aortic aneurysm (CMS/[facility]) I71.9       Follows with Vascular           Lower extremity edema R60.0       Off lasix.  Nursing does ace wrap legs daily.            COPD (chronic obstructive pulmonary disease) (CMS/[facility]) J44.9       Continue advair and albuterol prn.      KENTRELL:  improved off lasix.  Monitor weights.         Time spent: 15 min in review of chart, labs and orders, consultation with pt and documentation.

## 2024-03-11 NOTE — LETTER
Patient: Syed Meyers  : 1945    Encounter Date: 2024    No chief complaint on file.      Subjective  [id number] : Syed Meyers is a 78 y.o. male LTC resident at [facility].   HPI   Pt with h/o CVA, COPD, and dementia.   Pt seen while sitting in wheelchair.  He is alert, pleasant and interactive.   Pt seen in follow up for  HTN.   Blood pressures continue to be high at times.   Seems to be worse at night.   Hydralazine does seem to be effective for lowing his blood pressures.   He denies any headaches, chest pains or dizziness.  He does have dementia and is a poor historian.  No issues or complaints reported by nursing.    Has been noted to be sleeping more than usual.   He has had a weight loss over the last month and dietician reported that po intake is down.   Pt had labs which were stable and chest x-ray which was negative last Friday.   He continues to have bilateral lower leg edema.   No shortness of breath or cough reported.   No abdominal pain.  No reports of any n/v/d/c.  Code status is DNRCC.  Review of Systems   Constitutional:  Negative for appetite change, chills, fatigue and fever.   Eyes:  Negative for discharge and redness.   Respiratory:  Negative for cough, shortness of breath and wheezing.    Cardiovascular:  Negative for chest pain and leg swelling.   Gastrointestinal:  Negative for abdominal pain, constipation, diarrhea, nausea and vomiting.   Genitourinary:  Negative for dysuria, frequency and hematuria.   Neurological:  Negative for dizziness, tremors, weakness and headaches.   Psychiatric/Behavioral:  Positive for confusion (due to dementia). The patient is not nervous/anxious.    All other systems reviewed and are negative.      Objective  BP (!) 168/121   Pulse 71   Temp 37 °C (98.6 °F)   Resp 20   Wt 80.6 kg (177 lb 12.8 oz)   SpO2 98%   BMI 28.70 kg/m²    Physical Exam  Constitutional:       General: He is not in acute distress.  Eyes:      Pupils: Pupils are  equal, round, and reactive to light.   Cardiovascular:      Rate and Rhythm: Normal rate and regular rhythm.      Heart sounds: No murmur heard.  Pulmonary:      Effort: Pulmonary effort is normal. No respiratory distress.      Breath sounds: No wheezing, rhonchi or rales.   Abdominal:      General: Abdomen is flat. Bowel sounds are normal. There is no distension.      Palpations: Abdomen is soft. There is no mass.      Tenderness: There is no abdominal tenderness.   Musculoskeletal:         General: Swelling (.) present. Normal range of motion.   Skin:     General: Skin is warm and dry.      Findings: No erythema or rash.   Neurological:      General: No focal deficit present.      Mental Status: He is alert. Mental status is at baseline.      Comments: A&O x 1-2       Cerebrovascular accident (CVA) due to embolism of cerebral artery (CMS/[facility]) I63.40        With residual cognitive impairment.   Aspirin stopped due to GIB.            CKD (chronic kidney disease) stage 3, GFR 30-59 ml/min (CMS/[facility]) N18.30       Stable - monitoring monthly labs.            Dementia without behavioral disturbance, psychotic disturbance, mood disturbance, or anxiety (CMS/[facility]) F03.90       Continue Donepezil.           H/O deep venous thrombosis Z86.718       IVC filter has been removed.           HTN (hypertension), benign  I10       Blood pressures still running high.   Add norvasc 5mg in the evening.  Continue  losartan to 50mg daily.   Add hydralazine 25mg q8 hours prn for SBP > 160 and DBP > 100.   Monitor blood pressures and adjust meds as needed.           Aortic aneurysm (CMS/[facility]) I71.9       Follows with Vascular           Lower extremity edema R60.0       Off lasix.  Nursing does ace wrap legs daily.            COPD (chronic obstructive pulmonary disease) (CMS/[facility]) J44.9       Continue advair and albuterol prn.      KENTRELL:  improved off lasix.  Monitor weights.         Time spent: 15 min in review of chart, labs and orders,  consultation with pt and documentation.       Electronically Signed By: Karol Gant PA-C   3/11/24  1:52 PM

## 2024-03-18 ENCOUNTER — LAB REQUISITION (OUTPATIENT)
Dept: LAB | Facility: HOSPITAL | Age: 79
End: 2024-03-18
Payer: COMMERCIAL

## 2024-03-18 DIAGNOSIS — I10 ESSENTIAL (PRIMARY) HYPERTENSION: ICD-10-CM

## 2024-03-18 DIAGNOSIS — F03.90 UNSPECIFIED DEMENTIA, UNSPECIFIED SEVERITY, WITHOUT BEHAVIORAL DISTURBANCE, PSYCHOTIC DISTURBANCE, MOOD DISTURBANCE, AND ANXIETY (MULTI): ICD-10-CM

## 2024-03-18 LAB
ANION GAP SERPL CALC-SCNC: 7 MMOL/L
BUN SERPL-MCNC: 21 MG/DL (ref 8–25)
CALCIUM SERPL-MCNC: 8.7 MG/DL (ref 8.5–10.4)
CHLORIDE SERPL-SCNC: 110 MMOL/L (ref 97–107)
CO2 SERPL-SCNC: 25 MMOL/L (ref 24–31)
CREAT SERPL-MCNC: 1.5 MG/DL (ref 0.4–1.6)
EGFRCR SERPLBLD CKD-EPI 2021: 47 ML/MIN/1.73M*2
ERYTHROCYTE [DISTWIDTH] IN BLOOD BY AUTOMATED COUNT: 15.1 % (ref 11.5–14.5)
GLUCOSE SERPL-MCNC: 89 MG/DL (ref 65–99)
HCT VFR BLD AUTO: 37.1 % (ref 41–52)
HGB BLD-MCNC: 11.7 G/DL (ref 13.5–17.5)
MCH RBC QN AUTO: 27 PG (ref 26–34)
MCHC RBC AUTO-ENTMCNC: 31.5 G/DL (ref 32–36)
MCV RBC AUTO: 86 FL (ref 80–100)
NRBC BLD-RTO: 0 /100 WBCS (ref 0–0)
PLATELET # BLD AUTO: 170 X10*3/UL (ref 150–450)
POTASSIUM SERPL-SCNC: 3.9 MMOL/L (ref 3.4–5.1)
RBC # BLD AUTO: 4.33 X10*6/UL (ref 4.5–5.9)
SODIUM SERPL-SCNC: 142 MMOL/L (ref 133–145)
WBC # BLD AUTO: 3.8 X10*3/UL (ref 4.4–11.3)

## 2024-03-18 PROCEDURE — 85027 COMPLETE CBC AUTOMATED: CPT | Mod: OUT | Performed by: INTERNAL MEDICINE

## 2024-03-18 PROCEDURE — 80048 BASIC METABOLIC PNL TOTAL CA: CPT | Mod: OUT | Performed by: INTERNAL MEDICINE

## 2024-04-12 ENCOUNTER — NURSING HOME VISIT (OUTPATIENT)
Dept: POST ACUTE CARE | Facility: EXTERNAL LOCATION | Age: 79
End: 2024-04-12
Payer: COMMERCIAL

## 2024-04-12 VITALS
BODY MASS INDEX: 29.05 KG/M2 | DIASTOLIC BLOOD PRESSURE: 88 MMHG | HEART RATE: 81 BPM | TEMPERATURE: 98.1 F | RESPIRATION RATE: 18 BRPM | SYSTOLIC BLOOD PRESSURE: 133 MMHG | OXYGEN SATURATION: 95 % | WEIGHT: 180 LBS

## 2024-04-12 DIAGNOSIS — R60.0 LOWER EXTREMITY EDEMA: ICD-10-CM

## 2024-04-12 DIAGNOSIS — F01.B0 MODERATE VASCULAR DEMENTIA WITHOUT BEHAVIORAL DISTURBANCE, PSYCHOTIC DISTURBANCE, MOOD DISTURBANCE, OR ANXIETY (MULTI): ICD-10-CM

## 2024-04-12 DIAGNOSIS — I10 HTN (HYPERTENSION), BENIGN: Primary | ICD-10-CM

## 2024-04-12 DIAGNOSIS — I63.40 CEREBROVASCULAR ACCIDENT (CVA) DUE TO EMBOLISM OF CEREBRAL ARTERY (MULTI): ICD-10-CM

## 2024-04-12 PROCEDURE — 99308 SBSQ NF CARE LOW MDM 20: CPT | Performed by: PHYSICIAN ASSISTANT

## 2024-04-12 ASSESSMENT — ENCOUNTER SYMPTOMS
APPETITE CHANGE: 0
FREQUENCY: 0
CONSTIPATION: 0
WEAKNESS: 0
FEVER: 0
NAUSEA: 0
HEADACHES: 0
CHILLS: 0
ABDOMINAL PAIN: 0
COUGH: 0
FATIGUE: 0
HEMATURIA: 0
WHEEZING: 0
EYE REDNESS: 0
CONFUSION: 1
EYE DISCHARGE: 0
VOMITING: 0
TREMORS: 0
DIZZINESS: 0
SHORTNESS OF BREATH: 0
NERVOUS/ANXIOUS: 0
DYSURIA: 0
DIARRHEA: 0

## 2024-04-12 NOTE — PROGRESS NOTES
No chief complaint on file.      Subjective   [id number] : Syed Meyers is a 78 y.o. male LTC resident at [facility].   HPI   Pt with h/o CVA, COPD, and dementia.   Pt seen while sitting in wheelchair.  He is alert, pleasant and interactive.   Pt seen in follow up for  HTN.   Pt continues on norvasc, losartan and metoprolol for his bp management.   Reviewed blood pressures and they are much better.   No hypotension.   We stopped hydralazine due to blood pressures being below the hold parameter.   Pt seen while resting in bed.  He is alert and talkative.   He offers no new complaints.  He reports that he is doing well.  No new issues reported by nursing. He denies any headaches, chest pains or dizziness.  He does have dementia and is a poor historian.     No shortness of breath or cough reported.   No abdominal pain.  No reports of any n/v/d/c.   Code status is DNRCC.  Review of Systems   Constitutional:  Negative for appetite change, chills, fatigue and fever.   Eyes:  Negative for discharge and redness.   Respiratory:  Negative for cough, shortness of breath and wheezing.    Cardiovascular:  Negative for chest pain and leg swelling.   Gastrointestinal:  Negative for abdominal pain, constipation, diarrhea, nausea and vomiting.   Genitourinary:  Negative for dysuria, frequency and hematuria.   Neurological:  Negative for dizziness, tremors, weakness and headaches.   Psychiatric/Behavioral:  Positive for confusion (due to dementia). The patient is not nervous/anxious.    All other systems reviewed and are negative.      Objective   /88   Pulse 81   Temp 36.7 °C (98.1 °F)   Resp 18   Wt 81.6 kg (180 lb)   SpO2 95%   BMI 29.05 kg/m²    Physical Exam  Constitutional:       General: He is not in acute distress.  Eyes:      Pupils: Pupils are equal, round, and reactive to light.   Cardiovascular:      Rate and Rhythm: Normal rate and regular rhythm.      Heart sounds: No murmur heard.  Pulmonary:       Effort: Pulmonary effort is normal. No respiratory distress.      Breath sounds: No wheezing, rhonchi or rales.   Abdominal:      General: Abdomen is flat. Bowel sounds are normal. There is no distension.      Palpations: Abdomen is soft. There is no mass.      Tenderness: There is no abdominal tenderness.   Musculoskeletal:         General: Swelling (trace bilateral lower legs.) present. Normal range of motion.   Skin:     General: Skin is warm and dry.      Findings: No erythema or rash.   Neurological:      General: No focal deficit present.      Mental Status: He is alert. Mental status is at baseline.      Comments: A&O x 1-2       Cerebrovascular accident (CVA) due to embolism of cerebral artery (CMS/[facility]) I63.40        With residual cognitive impairment.   Aspirin stopped due to GIB.            CKD (chronic kidney disease) stage 3, GFR 30-59 ml/min (CMS/[facility]) N18.30       Stable - monitoring monthly labs.            Dementia without behavioral disturbance, psychotic disturbance, mood disturbance, or anxiety (CMS/[facility]) F03.90       Continue Donepezil.           H/O deep venous thrombosis Z86.718       IVC filter has been removed.           HTN (hypertension), benign  I10       Blood pressures well controlled on norvasc 5mg in the evening.   losartan to 50mg daily, and metoprolol.   Stopped  hydralazine 25mg q8 hours prn for SBP > 160 and DBP > 100.   Monitor blood pressures and adjust meds as needed.           Aortic aneurysm (CMS/[facility]) I71.9       Follows with Vascular           Lower extremity edema R60.0       Off lasix.  Nursing does ace wrap legs daily.            COPD (chronic obstructive pulmonary disease) (CMS/[facility]) J44.9       Continue advair and albuterol prn.      KENTRELL:  improved off lasix.  Monitor weights.         Time spent: 15 min in review of chart, labs and orders, consultation with pt and documentation.

## 2024-04-12 NOTE — LETTER
Patient: Syed Meyers  : 1945    Encounter Date: 2024    No chief complaint on file.      Subjective  [id number] : Syed Meyers is a 78 y.o. male LTC resident at [facility].   HPI   Pt with h/o CVA, COPD, and dementia.   Pt seen while sitting in wheelchair.  He is alert, pleasant and interactive.   Pt seen in follow up for  HTN.   Pt continues on norvasc, losartan and metoprolol for his bp management.   Reviewed blood pressures and they are much better.   No hypotension.   We stopped hydralazine due to blood pressures being below the hold parameter.   Pt seen while resting in bed.  He is alert and talkative.   He offers no new complaints.  He reports that he is doing well.  No new issues reported by nursing. He denies any headaches, chest pains or dizziness.  He does have dementia and is a poor historian.     No shortness of breath or cough reported.   No abdominal pain.  No reports of any n/v/d/c.   Code status is DNRCC.  Review of Systems   Constitutional:  Negative for appetite change, chills, fatigue and fever.   Eyes:  Negative for discharge and redness.   Respiratory:  Negative for cough, shortness of breath and wheezing.    Cardiovascular:  Negative for chest pain and leg swelling.   Gastrointestinal:  Negative for abdominal pain, constipation, diarrhea, nausea and vomiting.   Genitourinary:  Negative for dysuria, frequency and hematuria.   Neurological:  Negative for dizziness, tremors, weakness and headaches.   Psychiatric/Behavioral:  Positive for confusion (due to dementia). The patient is not nervous/anxious.    All other systems reviewed and are negative.      Objective  /88   Pulse 81   Temp 36.7 °C (98.1 °F)   Resp 18   Wt 81.6 kg (180 lb)   SpO2 95%   BMI 29.05 kg/m²    Physical Exam  Constitutional:       General: He is not in acute distress.  Eyes:      Pupils: Pupils are equal, round, and reactive to light.   Cardiovascular:      Rate and Rhythm: Normal rate and  regular rhythm.      Heart sounds: No murmur heard.  Pulmonary:      Effort: Pulmonary effort is normal. No respiratory distress.      Breath sounds: No wheezing, rhonchi or rales.   Abdominal:      General: Abdomen is flat. Bowel sounds are normal. There is no distension.      Palpations: Abdomen is soft. There is no mass.      Tenderness: There is no abdominal tenderness.   Musculoskeletal:         General: Swelling (trace bilateral lower legs.) present. Normal range of motion.   Skin:     General: Skin is warm and dry.      Findings: No erythema or rash.   Neurological:      General: No focal deficit present.      Mental Status: He is alert. Mental status is at baseline.      Comments: A&O x 1-2       Cerebrovascular accident (CVA) due to embolism of cerebral artery (CMS/[facility]) I63.40        With residual cognitive impairment.   Aspirin stopped due to GIB.            CKD (chronic kidney disease) stage 3, GFR 30-59 ml/min (CMS/[facility]) N18.30       Stable - monitoring monthly labs.            Dementia without behavioral disturbance, psychotic disturbance, mood disturbance, or anxiety (CMS/[facility]) F03.90       Continue Donepezil.           H/O deep venous thrombosis Z86.718       IVC filter has been removed.           HTN (hypertension), benign  I10       Blood pressures well controlled on norvasc 5mg in the evening.   losartan to 50mg daily, and metoprolol.   Stopped  hydralazine 25mg q8 hours prn for SBP > 160 and DBP > 100.   Monitor blood pressures and adjust meds as needed.           Aortic aneurysm (CMS/[facility]) I71.9       Follows with Vascular           Lower extremity edema R60.0       Off lasix.  Nursing does ace wrap legs daily.            COPD (chronic obstructive pulmonary disease) (CMS/[facility]) J44.9       Continue advair and albuterol prn.      KENTRELL:  improved off lasix.  Monitor weights.         Time spent: 15 min in review of chart, labs and orders, consultation with pt and documentation.       Electronically Signed  By: Karol Gant PA-C   4/12/24 10:21 AM

## 2024-05-17 ENCOUNTER — LAB REQUISITION (OUTPATIENT)
Dept: LAB | Facility: HOSPITAL | Age: 79
End: 2024-05-17
Payer: COMMERCIAL

## 2024-05-17 DIAGNOSIS — F03.90 UNSPECIFIED DEMENTIA, UNSPECIFIED SEVERITY, WITHOUT BEHAVIORAL DISTURBANCE, PSYCHOTIC DISTURBANCE, MOOD DISTURBANCE, AND ANXIETY (MULTI): ICD-10-CM

## 2024-05-17 LAB
ANION GAP SERPL CALC-SCNC: 9 MMOL/L
BUN SERPL-MCNC: 24 MG/DL (ref 8–25)
CALCIUM SERPL-MCNC: 8.5 MG/DL (ref 8.5–10.4)
CHLORIDE SERPL-SCNC: 110 MMOL/L (ref 97–107)
CO2 SERPL-SCNC: 25 MMOL/L (ref 24–31)
CREAT SERPL-MCNC: 1.5 MG/DL (ref 0.4–1.6)
EGFRCR SERPLBLD CKD-EPI 2021: 47 ML/MIN/1.73M*2
ERYTHROCYTE [DISTWIDTH] IN BLOOD BY AUTOMATED COUNT: 16.2 % (ref 11.5–14.5)
GLUCOSE SERPL-MCNC: 87 MG/DL (ref 65–99)
HCT VFR BLD AUTO: 36.3 % (ref 41–52)
HGB BLD-MCNC: 11.3 G/DL (ref 13.5–17.5)
MCH RBC QN AUTO: 27.2 PG (ref 26–34)
MCHC RBC AUTO-ENTMCNC: 31.1 G/DL (ref 32–36)
MCV RBC AUTO: 87 FL (ref 80–100)
NRBC BLD-RTO: 0 /100 WBCS (ref 0–0)
PLATELET # BLD AUTO: 160 X10*3/UL (ref 150–450)
POTASSIUM SERPL-SCNC: 4.7 MMOL/L (ref 3.4–5.1)
RBC # BLD AUTO: 4.16 X10*6/UL (ref 4.5–5.9)
SODIUM SERPL-SCNC: 144 MMOL/L (ref 133–145)
WBC # BLD AUTO: 4.3 X10*3/UL (ref 4.4–11.3)

## 2024-05-17 PROCEDURE — 80048 BASIC METABOLIC PNL TOTAL CA: CPT | Mod: OUT | Performed by: INTERNAL MEDICINE

## 2024-05-17 PROCEDURE — 85027 COMPLETE CBC AUTOMATED: CPT | Mod: OUT | Performed by: INTERNAL MEDICINE

## 2024-06-21 ENCOUNTER — NURSING HOME VISIT (OUTPATIENT)
Dept: POST ACUTE CARE | Facility: EXTERNAL LOCATION | Age: 79
End: 2024-06-21
Payer: COMMERCIAL

## 2024-06-21 VITALS
OXYGEN SATURATION: 93 % | SYSTOLIC BLOOD PRESSURE: 137 MMHG | TEMPERATURE: 98 F | DIASTOLIC BLOOD PRESSURE: 80 MMHG | WEIGHT: 178 LBS | RESPIRATION RATE: 18 BRPM | HEART RATE: 60 BPM | BODY MASS INDEX: 28.73 KG/M2

## 2024-06-21 DIAGNOSIS — I10 HTN (HYPERTENSION), BENIGN: Primary | ICD-10-CM

## 2024-06-21 DIAGNOSIS — F01.B0 MODERATE VASCULAR DEMENTIA WITHOUT BEHAVIORAL DISTURBANCE, PSYCHOTIC DISTURBANCE, MOOD DISTURBANCE, OR ANXIETY (MULTI): ICD-10-CM

## 2024-06-21 DIAGNOSIS — I63.40 CEREBROVASCULAR ACCIDENT (CVA) DUE TO EMBOLISM OF CEREBRAL ARTERY (MULTI): ICD-10-CM

## 2024-06-21 DIAGNOSIS — R60.0 LOWER EXTREMITY EDEMA: ICD-10-CM

## 2024-06-21 DIAGNOSIS — N18.30 STAGE 3 CHRONIC KIDNEY DISEASE, UNSPECIFIED WHETHER STAGE 3A OR 3B CKD (MULTI): ICD-10-CM

## 2024-06-21 PROCEDURE — 99308 SBSQ NF CARE LOW MDM 20: CPT | Performed by: PHYSICIAN ASSISTANT

## 2024-06-21 ASSESSMENT — ENCOUNTER SYMPTOMS
NAUSEA: 0
HEMATURIA: 0
HEADACHES: 0
FEVER: 0
ABDOMINAL PAIN: 0
APPETITE CHANGE: 0
WEAKNESS: 0
SHORTNESS OF BREATH: 0
FREQUENCY: 0
CONFUSION: 1
WHEEZING: 0
TREMORS: 0
EYE REDNESS: 0
DIARRHEA: 0
CONSTIPATION: 0
COUGH: 0
DYSURIA: 0
FATIGUE: 0
DIZZINESS: 0
VOMITING: 0
EYE DISCHARGE: 0
CHILLS: 0
NERVOUS/ANXIOUS: 0

## 2024-06-21 NOTE — LETTER
Patient: Syed Meyers  : 1945    Encounter Date: 2024      Subjective   [id number] : Syed Meyers is a 78 y.o. male LTC resident at [facility].   HPI   Pt with h/o CVA, COPD, and dementia.   Pt seen while sitting in wheelchair.  He is alert, pleasant and interactive.  Pt seen while sitting up in wheelchair.  He is alert, pleasant and interactive.  He had routine labs this which were reviewed and are stable.   Pt's blood pressures have been well controlled with current meds.   He offers no complaints.   Nursing reports no new issues.    He appears to be at baseline physically and cognitively.  He does have dementia and is a poor historian.    He denies any pain.   No shortness of breath or cough reported.   No abdominal pain.  No reports of any n/v/d/c.   Code status is DNRCC.  Review of Systems   Constitutional:  Negative for appetite change, chills, fatigue and fever.   Eyes:  Negative for discharge and redness.   Respiratory:  Negative for cough, shortness of breath and wheezing.    Cardiovascular:  Negative for chest pain and leg swelling.   Gastrointestinal:  Negative for abdominal pain, constipation, diarrhea, nausea and vomiting.   Genitourinary:  Negative for dysuria, frequency and hematuria.   Neurological:  Negative for dizziness, tremors, weakness and headaches.   Psychiatric/Behavioral:  Positive for confusion (due to dementia). The patient is not nervous/anxious.    All other systems reviewed and are negative.      Objective   /80   Pulse 60   Temp 36.7 °C (98 °F)   Resp 18   Wt 80.7 kg (178 lb)   SpO2 93%   BMI 28.73 kg/m²    Physical Exam  Constitutional:       General: He is not in acute distress.  Eyes:      Pupils: Pupils are equal, round, and reactive to light.   Cardiovascular:      Rate and Rhythm: Normal rate and regular rhythm.      Heart sounds: No murmur heard.  Pulmonary:      Effort: Pulmonary effort is normal. No respiratory distress.      Breath sounds:  No wheezing, rhonchi or rales.   Abdominal:      General: Abdomen is flat. Bowel sounds are normal. There is no distension.      Palpations: Abdomen is soft. There is no mass.      Tenderness: There is no abdominal tenderness.   Musculoskeletal:         General: Normal range of motion.      Right lower leg: Edema present.      Left lower leg: Edema present.   Skin:     General: Skin is warm and dry.      Findings: No erythema or rash.   Neurological:      General: No focal deficit present.      Mental Status: He is alert. Mental status is at baseline.      Comments: A&O x 1-2       Cerebrovascular accident (CVA) due to embolism of cerebral artery (CMS/[facility]) I63.40        With residual cognitive impairment.   Aspirin stopped due to GIB.            CKD (chronic kidney disease) stage 3, GFR 30-59 ml/min (CMS/[facility]) N18.30       Labs have been stable.  Decrease monitoring to q3 months.            Dementia without behavioral disturbance, psychotic disturbance, mood disturbance, or anxiety (CMS/[facility]) F03.90       Continue Donepezil.           H/O deep venous thrombosis Z86.718       IVC filter has been removed.           HTN (hypertension), benign  I10       Blood pressures well controlled on norvasc 5mg in the evening.   losartan to 50mg daily, and metoprolol.     Monitor blood pressures and adjust meds as needed.           Aortic aneurysm (CMS/[facility]) I71.9       Follows with Vascular           Lower extremity edema R60.0       Off lasix.  Nursing does ace wrap legs daily.  Weight is stable.            COPD (chronic obstructive pulmonary disease) (CMS/[facility]) J44.9       Continue advair and albuterol prn.      KENTRELL:  improved off lasix.  Labs are stable.           Time spent: 15 min in review of chart, labs and orders, consultation with pt and documentation.       Electronically Signed By: Karol Gant PA-C   6/21/24 12:47 PM

## 2024-06-21 NOTE — PROGRESS NOTES
Subjective   [id number] : Syed Meyers is a 78 y.o. male LTC resident at [facility].   HPI   Pt with h/o CVA, COPD, and dementia.   Pt seen while sitting in wheelchair.  He is alert, pleasant and interactive.  Pt seen while sitting up in wheelchair.  He is alert, pleasant and interactive.  He had routine labs this which were reviewed and are stable.   Pt's blood pressures have been well controlled with current meds.   He offers no complaints.   Nursing reports no new issues.    He appears to be at baseline physically and cognitively.  He does have dementia and is a poor historian.    He denies any pain.   No shortness of breath or cough reported.   No abdominal pain.  No reports of any n/v/d/c.   Code status is DNRCC.  Review of Systems   Constitutional:  Negative for appetite change, chills, fatigue and fever.   Eyes:  Negative for discharge and redness.   Respiratory:  Negative for cough, shortness of breath and wheezing.    Cardiovascular:  Negative for chest pain and leg swelling.   Gastrointestinal:  Negative for abdominal pain, constipation, diarrhea, nausea and vomiting.   Genitourinary:  Negative for dysuria, frequency and hematuria.   Neurological:  Negative for dizziness, tremors, weakness and headaches.   Psychiatric/Behavioral:  Positive for confusion (due to dementia). The patient is not nervous/anxious.    All other systems reviewed and are negative.      Objective   /80   Pulse 60   Temp 36.7 °C (98 °F)   Resp 18   Wt 80.7 kg (178 lb)   SpO2 93%   BMI 28.73 kg/m²    Physical Exam  Constitutional:       General: He is not in acute distress.  Eyes:      Pupils: Pupils are equal, round, and reactive to light.   Cardiovascular:      Rate and Rhythm: Normal rate and regular rhythm.      Heart sounds: No murmur heard.  Pulmonary:      Effort: Pulmonary effort is normal. No respiratory distress.      Breath sounds: No wheezing, rhonchi or rales.   Abdominal:      General: Abdomen is flat.  Bowel sounds are normal. There is no distension.      Palpations: Abdomen is soft. There is no mass.      Tenderness: There is no abdominal tenderness.   Musculoskeletal:         General: Normal range of motion.      Right lower leg: Edema present.      Left lower leg: Edema present.   Skin:     General: Skin is warm and dry.      Findings: No erythema or rash.   Neurological:      General: No focal deficit present.      Mental Status: He is alert. Mental status is at baseline.      Comments: A&O x 1-2       Cerebrovascular accident (CVA) due to embolism of cerebral artery (CMS/[facility]) I63.40        With residual cognitive impairment.   Aspirin stopped due to GIB.            CKD (chronic kidney disease) stage 3, GFR 30-59 ml/min (CMS/[facility]) N18.30       Labs have been stable.  Decrease monitoring to q3 months.            Dementia without behavioral disturbance, psychotic disturbance, mood disturbance, or anxiety (CMS/[facility]) F03.90       Continue Donepezil.           H/O deep venous thrombosis Z86.718       IVC filter has been removed.           HTN (hypertension), benign  I10       Blood pressures well controlled on norvasc 5mg in the evening.   losartan to 50mg daily, and metoprolol.     Monitor blood pressures and adjust meds as needed.           Aortic aneurysm (CMS/[facility]) I71.9       Follows with Vascular           Lower extremity edema R60.0       Off lasix.  Nursing does ace wrap legs daily.  Weight is stable.            COPD (chronic obstructive pulmonary disease) (CMS/[facility]) J44.9       Continue advair and albuterol prn.      KENTRELL:  improved off lasix.  Labs are stable.           Time spent: 15 min in review of chart, labs and orders, consultation with pt and documentation.

## 2024-06-25 ENCOUNTER — HOSPITAL ENCOUNTER (EMERGENCY)
Facility: HOSPITAL | Age: 79
Discharge: HOME | End: 2024-06-25
Payer: COMMERCIAL

## 2024-06-25 ENCOUNTER — APPOINTMENT (OUTPATIENT)
Dept: RADIOLOGY | Facility: HOSPITAL | Age: 79
End: 2024-06-25
Payer: COMMERCIAL

## 2024-06-25 VITALS
TEMPERATURE: 97.8 F | HEIGHT: 71 IN | OXYGEN SATURATION: 99 % | RESPIRATION RATE: 15 BRPM | SYSTOLIC BLOOD PRESSURE: 156 MMHG | WEIGHT: 188.93 LBS | HEART RATE: 89 BPM | DIASTOLIC BLOOD PRESSURE: 104 MMHG | BODY MASS INDEX: 26.45 KG/M2

## 2024-06-25 DIAGNOSIS — S09.90XA CLOSED HEAD INJURY, INITIAL ENCOUNTER: ICD-10-CM
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
## 2024-06-25 PROCEDURE — 72125 CT NECK SPINE W/O DYE: CPT

## 2024-06-25 PROCEDURE — 70450 CT HEAD/BRAIN W/O DYE: CPT

## 2024-06-25 PROCEDURE — 70450 CT HEAD/BRAIN W/O DYE: CPT | Performed by: STUDENT IN AN ORGANIZED HEALTH CARE EDUCATION/TRAINING PROGRAM

## 2024-06-25 PROCEDURE — 99285 EMERGENCY DEPT VISIT HI MDM: CPT | Mod: 25

## 2024-06-25 PROCEDURE — 72125 CT NECK SPINE W/O DYE: CPT | Performed by: STUDENT IN AN ORGANIZED HEALTH CARE EDUCATION/TRAINING PROGRAM

## 2024-06-25 ASSESSMENT — PAIN DESCRIPTION - LOCATION: LOCATION: SHOULDER

## 2024-06-25 ASSESSMENT — COLUMBIA-SUICIDE SEVERITY RATING SCALE - C-SSRS
1. IN THE PAST MONTH, HAVE YOU WISHED YOU WERE DEAD OR WISHED YOU COULD GO TO SLEEP AND NOT WAKE UP?: NO
6. HAVE YOU EVER DONE ANYTHING, STARTED TO DO ANYTHING, OR PREPARED TO DO ANYTHING TO END YOUR LIFE?: NO
2. HAVE YOU ACTUALLY HAD ANY THOUGHTS OF KILLING YOURSELF?: NO

## 2024-06-25 ASSESSMENT — PAIN DESCRIPTION - ORIENTATION: ORIENTATION: RIGHT

## 2024-06-25 ASSESSMENT — PAIN DESCRIPTION - PAIN TYPE: TYPE: ACUTE PAIN

## 2024-06-25 ASSESSMENT — PAIN - FUNCTIONAL ASSESSMENT: PAIN_FUNCTIONAL_ASSESSMENT: 0-10

## 2024-06-25 ASSESSMENT — PAIN SCALES - GENERAL: PAINLEVEL_OUTOF10: 2

## 2024-06-26 NOTE — DISCHARGE INSTRUCTIONS
The scan of head and cervical spine showed no evidence of acute bleed or fracture.  Patient will be discharged back to [place] facility.  PCP follow-up 1 to 2 days for reevaluation after emergency department visit.

## 2024-06-26 NOTE — ED PROVIDER NOTES
HPI   Chief Complaint   Patient presents with    Fall     Patient presents from [facility] for fall. Per EMS patient was getting himself off toilet (unassisted) and fell hitting the back of his head. Hematoma to the back right side. No LOC. Not on blood thinner. Patient is DNR. Provider wanted patient sent for head and cervical CT.        This is a 78-year-old male with a past medical history of dementia presenting to the emergency department from [facility] for fall.  Patient reportedly was using the restroom and try to get off the toilet unassisted.  He fell backwards hitting the right side of his head.  There was no reported loss of consciousness, he does not take any blood thinners.  Patient denies any preceding lightheadedness, chest pain or shortness of breath.  It was reported by nursing staff the patient has tried to get up from seated positions unassisted prior with falls.  Fall was reportedly unwitnessed today.  Due to patient hitting the right aspect of his head, they did want him to be seen for CT scan.  Patient is a DNR.  Patient currently does not have any complaints.  No reported chest pain, shortness of breath, head pain or pain elsewhere in his body.  He is present in a c-collar.      Please see HPI for pertinent positive and negative ROS.                   Shanta Coma Scale Score: 15                     Patient History   No past medical history on file.  No past surgical history on file.  No family history on file.  Social History     Tobacco Use    Smoking status: Not on file    Smokeless tobacco: Not on file   Substance Use Topics    Alcohol use: Not on file    Drug use: Not on file       Physical Exam   ED Triage Vitals [06/25/24 2021]   Temperature Heart Rate Respirations BP   36.6 °C (97.8 °F) 89 15 (!) 156/104      Pulse Ox Temp Source Heart Rate Source Patient Position   99 % Temporal Monitor Lying      BP Location FiO2 (%)     Right arm --       Physical Exam  GENERAL  APPEARANCE: Awake and alert. No acute respiratory distress.   VITAL SIGNS: As per the nurses' triage record.  HEENT: Normocephalic, atraumatic. Extraocular muscles are intact. Conjunctiva are pink. Negative scleral icterus. Mucous membranes are moist. Tongue in the midline. Oropharynx clear, uvula midline.  Is a superficial abrasion over the right posterior inferior aspect of scalp.  No laceration.  Palpable scalp hematoma appreciated.  NECK: Soft, nontender and supple, presents in c-collar, therefore initial cervical exam is limited.    CHEST: Nontender to palpation. Clear to auscultation bilaterally. No rales, rhonchi, or wheezing. Symmetric rise and fall of chest wall.   HEART: Clear S1 and S2. Regular rate and rhythm. No murmurs appreciated on auscultation.  Strong and equal pulses in the extremities.  ABDOMEN: Soft, nontender, nondistended, positive bowel sounds, no palpable masses.  MUSCULOSKELETAL: The calves are nontender to palpation. Full gross active range of motion of upper and lower extremities bilaterally.  No bony tenderness over the long bones of upper or lower extremities bilaterally.  No tenderness palpation over the pelvic region, thoracic or lumbar spinous process.  No bony step-offs appreciated.  No trans palpation over the chest wall.   NEUROLOGICAL: Awake, alert and oriented x 1 which is patient's baseline. motor power intact in the upper and lower extremities. Sensation is intact to light touch in the upper and lower extremities. Patient answering questions appropriately.  He does have a history of dementia and some of his history is unclear, however he is able to answer questions when asked.  IMMUNOLOGICAL: No lymphatic streaking noted  DERMATOLOGIC: Warm and dry without petechiae, rashes, or ecchymosis noted on visible skin.   PYSCH: Patient does have history of dementia.  He is cooperative.  ED Course & MDM   Diagnoses as of 06/25/24 2151   Fall, initial encounter   Closed head injury,  initial encounter       Medical Decision Making  Parts of this chart have been completed using voice recognition software. Please excuse any errors of transcription.  My thought process and reason for plan has been formulated from the time that I saw the patient until the time of disposition and is not specific to one specific moment during their visit and furthermore my MDM encompasses this entire chart and not only this text box.      HPI: Detailed above.    Exam: A medically appropriate exam performed, outlined above, given the known history and presentation.    History obtained from: Patient, EMS and nursing facility    Medications given during visit:  Medications - No data to display     Diagnostic/tests  Labs Reviewed - No data to display   CT head wo IV contrast   Final Result   CT HEAD:   1. No acute intracranial abnormality or calvarial fracture.   2. Moderate burden of supratentorial and infratentorial chronic   ischemic changes as described above with new chronic lacunar infarct   in the left cerebellar hemisphere noted.             CT CERVICAL SPINE:   1. No acute fracture or traumatic malalignment of the cervical spine.   2. Spondylotic changes of the cervical spine as detailed above.        MACRO:   None.        Signed by: Blaise Ruth 6/25/2024 9:42 PM   Dictation workstation:   [id number]      CT cervical spine wo IV contrast   Final Result   CT HEAD:   1. No acute intracranial abnormality or calvarial fracture.   2. Moderate burden of supratentorial and infratentorial chronic   ischemic changes as described above with new chronic lacunar infarct   in the left cerebellar hemisphere noted.             CT CERVICAL SPINE:   1. No acute fracture or traumatic malalignment of the cervical spine.   2. Spondylotic changes of the cervical spine as detailed above.        MACRO:   None.        Signed by: Blaise Ruth 6/25/2024 9:42 PM   Dictation workstation:   [id number]            Considerations/further MDM:    This is a 78-year-old male who presented for an unwitnessed fall today at skilled nursing facility.  Patient does have history of dementia and is cognitively impaired at baseline.  Unable to provide a clear history as to what happened.  Per nursing facility, patient had unwitnessed fall in the bathroom getting off the toilet today.  Patient denies any current pain.  No chest pain, shortness of breath, or lightheadedness.  CT scan of cervical spine shows no acute fracture or traumatic malalignment of the cervical spine.  CT scan of the head shows no acute intracranial abnormality or fracture.  There is chronic ischemic changes seen in the brain.  Patient does have history of nose and cerebrovascular accidents.  Patient is a DNR.  He has remained stable in the emergency department.  Will be discharged back to [facility].  Recommend follow-up with his primary care doctor in 1 to 2 days.  Patient was discharged in stable condition.    Procedure  Procedures     Joceline Singh PA-C  06/25/24 8235

## 2024-07-19 ENCOUNTER — NURSING HOME VISIT (OUTPATIENT)
Dept: POST ACUTE CARE | Facility: EXTERNAL LOCATION | Age: 79
End: 2024-07-19
Payer: COMMERCIAL

## 2024-07-19 VITALS
WEIGHT: 190.8 LBS | TEMPERATURE: 98.2 F | SYSTOLIC BLOOD PRESSURE: 113 MMHG | RESPIRATION RATE: 16 BRPM | DIASTOLIC BLOOD PRESSURE: 76 MMHG | OXYGEN SATURATION: 97 % | BODY MASS INDEX: 26.61 KG/M2 | HEART RATE: 54 BPM

## 2024-07-19 DIAGNOSIS — I63.40 CEREBROVASCULAR ACCIDENT (CVA) DUE TO EMBOLISM OF CEREBRAL ARTERY (MULTI): ICD-10-CM

## 2024-07-19 DIAGNOSIS — I10 HTN (HYPERTENSION), BENIGN: Primary | ICD-10-CM

## 2024-07-19 DIAGNOSIS — R60.0 LOWER EXTREMITY EDEMA: ICD-10-CM

## 2024-07-19 DIAGNOSIS — F01.B0 MODERATE VASCULAR DEMENTIA WITHOUT BEHAVIORAL DISTURBANCE, PSYCHOTIC DISTURBANCE, MOOD DISTURBANCE, OR ANXIETY (MULTI): ICD-10-CM

## 2024-07-19 DIAGNOSIS — N18.30 STAGE 3 CHRONIC KIDNEY DISEASE, UNSPECIFIED WHETHER STAGE 3A OR 3B CKD (MULTI): ICD-10-CM

## 2024-07-19 DIAGNOSIS — I71.9 AORTIC ANEURYSM WITHOUT RUPTURE, UNSPECIFIED PORTION OF AORTA (CMS-HCC): ICD-10-CM

## 2024-07-19 ASSESSMENT — ENCOUNTER SYMPTOMS
FREQUENCY: 0
HEADACHES: 0
DIZZINESS: 0
EYE DISCHARGE: 0
FATIGUE: 0
TREMORS: 0
WEAKNESS: 0
NERVOUS/ANXIOUS: 0
VOMITING: 0
ABDOMINAL PAIN: 0
NAUSEA: 0
DYSURIA: 0
EYE REDNESS: 0
CHILLS: 0
COUGH: 0
WHEEZING: 0
CONFUSION: 1
SHORTNESS OF BREATH: 0
CONSTIPATION: 0
DIARRHEA: 0
FEVER: 0
HEMATURIA: 0
APPETITE CHANGE: 0

## 2024-07-19 NOTE — LETTER
Patient: Syed Meyers  : 1945    Encounter Date: 2024      Subjective  [id number] : Syed Meyers is a 78 y.o. male LTC resident at [facility].   HPI   Pt with h/o CVA, COPD, and dementia.   Pt seen while sitting in wheelchair.  He is alert, pleasant and interactive.  Pt family has initiated hospice services and pt followed by [facility] hospice.   He has been sleeping more but is easily arousable.  He continues to have good po intake.  He has had a weight gain and does have a some chronic lower leg edema.   Pt seen while sitting up in wheelchair.  He is alert, pleasant and interactive.  He has had some lower blood pressures but bp is variable and nursing has been holding medications per parameters.  He offers no complaints.   Nursing reports no new issues.  He appears to be at baseline physically and cognitively.  He does have dementia and is a poor historian.    He denies any pain.   No shortness of breath or cough reported.   No abdominal pain.  No reports of any n/v/d/c.   Code status is DNRCC.  Review of Systems   Constitutional:  Negative for appetite change, chills, fatigue and fever.   Eyes:  Negative for discharge and redness.   Respiratory:  Negative for cough, shortness of breath and wheezing.    Cardiovascular:  Negative for chest pain and leg swelling.   Gastrointestinal:  Negative for abdominal pain, constipation, diarrhea, nausea and vomiting.   Genitourinary:  Negative for dysuria, frequency and hematuria.   Neurological:  Negative for dizziness, tremors, weakness and headaches.   Psychiatric/Behavioral:  Positive for confusion (due to dementia). The patient is not nervous/anxious.    All other systems reviewed and are negative.      Objective  /76   Pulse 54   Temp 36.8 °C (98.2 °F)   Resp 16   Wt 86.5 kg (190 lb 12.8 oz)   SpO2 97%   BMI 26.61 kg/m²    Physical Exam  Constitutional:       General: He is not in acute distress.  Eyes:      Pupils: Pupils are equal,  round, and reactive to light.   Cardiovascular:      Rate and Rhythm: Normal rate and regular rhythm.      Heart sounds: No murmur heard.  Pulmonary:      Effort: Pulmonary effort is normal. No respiratory distress.      Breath sounds: No wheezing, rhonchi or rales.   Abdominal:      General: Abdomen is flat. Bowel sounds are normal. There is no distension.      Palpations: Abdomen is soft. There is no mass.      Tenderness: There is no abdominal tenderness.   Musculoskeletal:         General: Normal range of motion.      Right lower leg: Edema present.      Left lower leg: Edema present.   Skin:     General: Skin is warm and dry.      Findings: No erythema or rash.   Neurological:      General: No focal deficit present.      Mental Status: He is alert. Mental status is at baseline.      Comments: A&O x 1-2       Cerebrovascular accident (CVA) due to embolism of cerebral artery (CMS/[facility]) I63.40        With residual cognitive impairment.   Aspirin stopped due to GIB.            CKD (chronic kidney disease) stage 3, GFR 30-59 ml/min (CMS/[facility]) N18.30       Labs have been stable.           Dementia without behavioral disturbance, psychotic disturbance, mood disturbance, or anxiety (CMS/[facility]) F03.90       Pt under hospice care.  Medications minimized.            H/O deep venous thrombosis Z86.718       IVC filter has been removed.           HTN (hypertension), benign  I10       continue norvasc 5mg in the evening.   losartan to 50mg daily, and metoprolol.  Hold parameters in place.    Monitor blood pressures and adjust meds as needed.           Aortic aneurysm (CMS/[facility]) I71.9               Lower extremity edema R60.0       Off lasix.  Nursing does ace wrap legs daily.           COPD (chronic obstructive pulmonary disease) (CMS/[facility]) J44.9       Continue advair and albuterol prn.          Time spent: 15 min in review of chart, labs and orders, consultation with pt and documentation.       Electronically Signed By: Karol BROOKE  RADHA Gant   7/19/24  2:49 PM

## 2024-07-19 NOTE — PROGRESS NOTES
Subjective   [id number] : Syed Meyers is a 78 y.o. male LTC resident at [facility].   HPI   Pt with h/o CVA, COPD, and dementia.   Pt seen while sitting in wheelchair.  He is alert, pleasant and interactive.  Pt family has initiated hospice services and pt followed by [facility] hospice.   He has been sleeping more but is easily arousable.  He continues to have good po intake.  He has had a weight gain and does have a some chronic lower leg edema.   Pt seen while sitting up in wheelchair.  He is alert, pleasant and interactive.  He has had some lower blood pressures but bp is variable and nursing has been holding medications per parameters.  He offers no complaints.   Nursing reports no new issues.  He appears to be at baseline physically and cognitively.  He does have dementia and is a poor historian.    He denies any pain.   No shortness of breath or cough reported.   No abdominal pain.  No reports of any n/v/d/c.   Code status is DNRCC.  Review of Systems   Constitutional:  Negative for appetite change, chills, fatigue and fever.   Eyes:  Negative for discharge and redness.   Respiratory:  Negative for cough, shortness of breath and wheezing.    Cardiovascular:  Negative for chest pain and leg swelling.   Gastrointestinal:  Negative for abdominal pain, constipation, diarrhea, nausea and vomiting.   Genitourinary:  Negative for dysuria, frequency and hematuria.   Neurological:  Negative for dizziness, tremors, weakness and headaches.   Psychiatric/Behavioral:  Positive for confusion (due to dementia). The patient is not nervous/anxious.    All other systems reviewed and are negative.      Objective   /76   Pulse 54   Temp 36.8 °C (98.2 °F)   Resp 16   Wt 86.5 kg (190 lb 12.8 oz)   SpO2 97%   BMI 26.61 kg/m²    Physical Exam  Constitutional:       General: He is not in acute distress.  Eyes:      Pupils: Pupils are equal, round, and reactive to light.   Cardiovascular:      Rate and Rhythm:  Normal rate and regular rhythm.      Heart sounds: No murmur heard.  Pulmonary:      Effort: Pulmonary effort is normal. No respiratory distress.      Breath sounds: No wheezing, rhonchi or rales.   Abdominal:      General: Abdomen is flat. Bowel sounds are normal. There is no distension.      Palpations: Abdomen is soft. There is no mass.      Tenderness: There is no abdominal tenderness.   Musculoskeletal:         General: Normal range of motion.      Right lower leg: Edema present.      Left lower leg: Edema present.   Skin:     General: Skin is warm and dry.      Findings: No erythema or rash.   Neurological:      General: No focal deficit present.      Mental Status: He is alert. Mental status is at baseline.      Comments: A&O x 1-2       Cerebrovascular accident (CVA) due to embolism of cerebral artery (CMS/[facility]) I63.40        With residual cognitive impairment.   Aspirin stopped due to GIB.            CKD (chronic kidney disease) stage 3, GFR 30-59 ml/min (CMS/[facility]) N18.30       Labs have been stable.           Dementia without behavioral disturbance, psychotic disturbance, mood disturbance, or anxiety (CMS/[facility]) F03.90       Pt under hospice care.  Medications minimized.            H/O deep venous thrombosis Z86.718       IVC filter has been removed.           HTN (hypertension), benign  I10       continue norvasc 5mg in the evening.   losartan to 50mg daily, and metoprolol.  Hold parameters in place.    Monitor blood pressures and adjust meds as needed.           Aortic aneurysm (CMS/[facility]) I71.9               Lower extremity edema R60.0       Off lasix.  Nursing does ace wrap legs daily.           COPD (chronic obstructive pulmonary disease) (CMS/[facility]) J44.9       Continue advair and albuterol prn.          Time spent: 15 min in review of chart, labs and orders, consultation with pt and documentation.

## 2024-07-30 PROCEDURE — 81001 URINALYSIS AUTO W/SCOPE: CPT | Mod: OUT | Performed by: INTERNAL MEDICINE

## 2024-07-30 PROCEDURE — 87086 URINE CULTURE/COLONY COUNT: CPT | Mod: OUT,TRILAB,WESLAB | Performed by: INTERNAL MEDICINE

## 2024-07-31 ENCOUNTER — LAB REQUISITION (OUTPATIENT)
Dept: LAB | Facility: HOSPITAL | Age: 79
End: 2024-07-31
Payer: COMMERCIAL

## 2024-07-31 DIAGNOSIS — G31.1: ICD-10-CM

## 2024-07-31 DIAGNOSIS — F03.90 UNSPECIFIED DEMENTIA, UNSPECIFIED SEVERITY, WITHOUT BEHAVIORAL DISTURBANCE, PSYCHOTIC DISTURBANCE, MOOD DISTURBANCE, AND ANXIETY (MULTI): ICD-10-CM

## 2024-07-31 LAB
APPEARANCE UR: ABNORMAL
BACTERIA #/AREA URNS AUTO: ABNORMAL /HPF
BILIRUB UR STRIP.AUTO-MCNC: NEGATIVE MG/DL
COLOR UR: YELLOW
GLUCOSE UR STRIP.AUTO-MCNC: NORMAL MG/DL
HOLD SPECIMEN: NORMAL
KETONES UR STRIP.AUTO-MCNC: NEGATIVE MG/DL
LEUKOCYTE ESTERASE UR QL STRIP.AUTO: ABNORMAL
MUCOUS THREADS #/AREA URNS AUTO: ABNORMAL /LPF
NITRITE UR QL STRIP.AUTO: ABNORMAL
PH UR STRIP.AUTO: 6.5 [PH]
PROT UR STRIP.AUTO-MCNC: ABNORMAL MG/DL
RBC # UR STRIP.AUTO: NEGATIVE /UL
RBC #/AREA URNS AUTO: ABNORMAL /HPF
SP GR UR STRIP.AUTO: 1.02
UROBILINOGEN UR STRIP.AUTO-MCNC: ABNORMAL MG/DL
WBC #/AREA URNS AUTO: >50 /HPF
WBC CLUMPS #/AREA URNS AUTO: ABNORMAL /HPF

## 2024-08-02 LAB — BACTERIA UR CULT: ABNORMAL

## 2024-08-13 DIAGNOSIS — R45.1 TERMINAL RESTLESSNESS: Primary | ICD-10-CM

## 2024-08-13 DIAGNOSIS — F41.9 ANXIETY: ICD-10-CM

## 2024-08-13 RX ORDER — LORAZEPAM 0.5 MG/1
0.5 TABLET ORAL EVERY 4 HOURS PRN
Qty: 30 TABLET | Refills: 5 | Status: SHIPPED | OUTPATIENT
Start: 2024-08-13 | End: 2024-11-11

## 2024-08-13 RX ORDER — MORPHINE SULFATE 20 MG/ML
10 SOLUTION ORAL
Qty: 30 ML | Refills: 0 | Status: SHIPPED | OUTPATIENT
Start: 2024-08-13 | End: 2024-10-12

## 2024-08-13 NOTE — PROGRESS NOTES
I have personally reviewed the OARRS report for this patient.  I have considered the risks of abuse, dependence, addiction, and diversion.  I believe that it is clinically appropriate for this patient to be prescribed this medication based on the documented diagnosis.  Clay Edge MD